# Patient Record
Sex: FEMALE | Race: BLACK OR AFRICAN AMERICAN | Employment: OTHER | URBAN - METROPOLITAN AREA
[De-identification: names, ages, dates, MRNs, and addresses within clinical notes are randomized per-mention and may not be internally consistent; named-entity substitution may affect disease eponyms.]

---

## 2019-11-11 ENCOUNTER — OFFICE VISIT (OUTPATIENT)
Dept: SURGERY | Age: 64
End: 2019-11-11

## 2019-11-11 VITALS
HEIGHT: 63 IN | OXYGEN SATURATION: 94 % | BODY MASS INDEX: 39.34 KG/M2 | HEART RATE: 84 BPM | SYSTOLIC BLOOD PRESSURE: 126 MMHG | DIASTOLIC BLOOD PRESSURE: 82 MMHG | TEMPERATURE: 97.7 F | RESPIRATION RATE: 17 BRPM | WEIGHT: 222 LBS

## 2019-11-11 DIAGNOSIS — K21.9 GASTROESOPHAGEAL REFLUX DISEASE, ESOPHAGITIS PRESENCE NOT SPECIFIED: ICD-10-CM

## 2019-11-11 DIAGNOSIS — E11.9 TYPE 2 DIABETES MELLITUS WITHOUT COMPLICATION, UNSPECIFIED WHETHER LONG TERM INSULIN USE (HCC): ICD-10-CM

## 2019-11-11 DIAGNOSIS — E66.01 SEVERE OBESITY (HCC): Primary | ICD-10-CM

## 2019-11-11 DIAGNOSIS — K75.81 NASH (NONALCOHOLIC STEATOHEPATITIS): ICD-10-CM

## 2019-11-11 DIAGNOSIS — I10 ESSENTIAL HYPERTENSION: ICD-10-CM

## 2019-11-11 RX ORDER — FLUTICASONE PROPIONATE 44 UG/1
AEROSOL, METERED RESPIRATORY (INHALATION)
COMMUNITY
End: 2022-01-19 | Stop reason: ALTCHOICE

## 2019-11-11 RX ORDER — OMEPRAZOLE 20 MG/1
TABLET, DELAYED RELEASE ORAL
COMMUNITY
Start: 2018-01-22

## 2019-11-11 RX ORDER — MELATONIN
2000 DAILY
COMMUNITY

## 2019-11-11 RX ORDER — HYDROCHLOROTHIAZIDE 25 MG/1
TABLET ORAL
COMMUNITY
Start: 2016-02-24

## 2019-11-11 RX ORDER — GLIMEPIRIDE 2 MG/1
TABLET ORAL
COMMUNITY
Start: 2016-02-24 | End: 2019-11-11 | Stop reason: CLARIF

## 2019-11-11 RX ORDER — DICLOFENAC SODIUM 75 MG/1
TABLET, DELAYED RELEASE ORAL
COMMUNITY
Start: 2017-04-10

## 2019-11-11 RX ORDER — INSULIN DEGLUDEC 100 U/ML
INJECTION, SOLUTION SUBCUTANEOUS
COMMUNITY
Start: 2017-06-21 | End: 2022-09-16 | Stop reason: SDUPTHER

## 2019-11-11 RX ORDER — CETIRIZINE HCL 10 MG
TABLET ORAL
COMMUNITY

## 2019-11-11 RX ORDER — AMLODIPINE BESYLATE 10 MG/1
TABLET ORAL
COMMUNITY

## 2019-11-11 RX ORDER — GLIPIZIDE 5 MG/1
TABLET ORAL
COMMUNITY
End: 2022-01-19 | Stop reason: ALTCHOICE

## 2019-11-11 RX ORDER — ALBUTEROL SULFATE 90 UG/1
AEROSOL, METERED RESPIRATORY (INHALATION)
COMMUNITY

## 2019-11-11 RX ORDER — RAMIPRIL 10 MG/1
CAPSULE ORAL
COMMUNITY
Start: 2016-02-24 | End: 2022-01-19 | Stop reason: ALTCHOICE

## 2019-11-11 RX ORDER — EZETIMIBE 10 MG/1
1 TABLET ORAL DAILY
COMMUNITY

## 2019-11-11 NOTE — PROGRESS NOTES
1. Have you been to the ER, urgent care clinic since your last visit? Hospitalized since your last visit? No     2. Have you seen or consulted any other health care providers outside of the [address] [place] [address] since your last visit? Include any pap smears or colon screening. No    Mauricio Villareal  Body composition    female  75 y.o. Vitals:    11/11/19 1331   Weight: 222 lb (100.7 kg)   Height: 5' 3\" (1.6 m)     Body mass index is 39.33 kg/m². Clovia Carls Neck-  14in  Waist- 44.5in  Hips- 46in

## 2019-11-11 NOTE — PROGRESS NOTES
HISTORY OF PRESENT ILLNESS  Geovanna Cuevas is new patient presents today for evaluation for weight loss surgery. Patient has been overweight for the past 25-30 years. Her weight has ranged from 190-234 lbs. Her heaviest weight is 234 lbs. Sandra Pastors  Body composition    female  75 y.o. Vitals:    11/11/19 1331   Weight: 222 lb (100.7 kg)   Height: 5' 3\" (1.6 m)     Body mass index is 39.33 kg/m². Raine Shadow Neck-  14in  Waist- 44.5in  Hips- 46in     Dietary Habits: Breakfast- oatmeal, egg with [place] and toast  Lunch- sandwich or soup  Dinner- fried foods, chicken and salads, chinese food. Snacking- chocolate, popcorn, grapes and apples, cookies  Liquids- diet ginger ale, flavored water    Prior weight loss attempts: unsupervised diets. She states that cutting back on eating  She lost 15 lbs. Patient has an advanced directive:  Not on file. Ms. Saleem Ford has a reminder for a \"due or due soon\" health maintenance. I have asked that she contact her primary care provider for follow-up on this health maintenance.       Patient Active Problem List   Diagnosis Code    Severe obesity ([address] E66.01       Past Medical History:   Diagnosis Date    Acid indigestion     Asthma     Back pain     Constipation     Depression     Diabetes ([address]     Hypertension     Insomnia     Multiple stiff joints     Muscle ache     Nausea     Shortness of breath     Vomiting     Weakness     Wheezing          Past Surgical History:   Procedure Laterality Date    HX CHOLECYSTECTOMY      HX GI      benign tumor on stomach muscle     HX GYN      Hysterectomy     HX ORTHOPAEDIC      right knee replacement     HX ORTHOPAEDIC      bilateral carpal tunnel repair            Joseph Dumont MD      Allergies not on file      Family History   Problem Relation Age of Onset    Diabetes Mother     Stroke Mother     Cancer Father         Prostate    Hypertension Father     Stroke Father     Diabetes Sister     Heart Disease Sister     Stroke Sister     Hypertension Brother        Social History     Socioeconomic History    Marital status: UNKNOWN     Spouse name: Not on file    Number of children: Not on file    Years of education: Not on file    Highest education level: Not on file   Occupational History    Not on file   Social Needs    Financial resource strain: Not on file    Food insecurity:     Worry: Not on file     Inability: Not on file    Transportation needs:     Medical: Not on file     Non-medical: Not on file   Tobacco Use    Smoking status: Never Smoker    Smokeless tobacco: Never Used   Substance and Sexual Activity    Alcohol use: Not Currently    Drug use: Never    Sexual activity: Yes   Lifestyle    Physical activity:     Days per week: Not on file     Minutes per session: Not on file    Stress: Not on file   Relationships    Social connections:     Talks on phone: Not on file     Gets together: Not on file     Attends Nondenominational service: Not on file     Active member of club or organization: Not on file     Attends meetings of clubs or organizations: Not on file     Relationship status: Not on file    Intimate partner violence:     Fear of current or ex partner: Not on file     Emotionally abused: Not on file     Physically abused: Not on file     Forced sexual activity: Not on file   Other Topics Concern    Not on file   Social History Narrative    Not on file     HPI    Review of Systems   Constitutional: Negative for chills, fever and malaise/fatigue. HENT: Negative for congestion, hearing loss, sinus pain and sore throat. Eyes: Positive for blurred vision. Negative for double vision, pain, discharge and redness. Respiratory: Positive for shortness of breath. Negative for cough. Cardiovascular: Positive for leg swelling (ankles swelling). Negative for chest pain and palpitations. Gastrointestinal: Positive for heartburn.  Negative for abdominal pain (on exertion), constipation, diarrhea, nausea and vomiting. Hx of heartburn. She states that food sometimes gets stuck in her esophagus and she may need her esophagus stretched. She has been referred back to her GI DrBo Haywood. Genitourinary: Positive for frequency. Negative for dysuria and urgency. Musculoskeletal: Positive for back pain and joint pain. Negative for neck pain. Skin: Negative for itching and rash. Neurological: Negative for dizziness, seizures and headaches. Psychiatric/Behavioral: Positive for depression (about her weight). The patient has insomnia. Physical Exam   Constitutional: She is oriented to person, place, and time. She appears well-developed and well-nourished. Cardiovascular: Normal rate and regular rhythm. Exam reveals no gallop and no friction rub. No murmur heard. Pulmonary/Chest: Effort normal and breath sounds normal.   Abdominal: Soft. Bowel sounds are normal. She exhibits no distension. There is no tenderness. No masses or hernias noted   Neurological: She is alert and oriented to person, place, and time. Skin: Skin is warm and dry. Psychiatric: She has a normal mood and affect. ASSESSMENT and PLAN  Sleeve gastrectomy or vertical gastric resection involves a resection of the vast majority of the stomach usually done with a dilator pressed against the right half of the stomach of the lesser curvature. One preserves the pyloric sphincter at the lower end of the stomach and then sequentially staples and divides all the way up to the gastroesophageal junction leaving a small rim of the stomach to the left better known as the angle of His. This procedure significantly reduces the amount that the stomach can hold while removing the part of the stomach that secretes the hormone grehlin and alters the speed of food emptying from the stomach.  Once food leaves the stomach, the remainder of the intestinal tract is completely intact and there is no effect on the digestion or absorption of food nutrients and calories. The procedure is intermediate between the adjustable gastric band and the gastric bypass as far as weight loss, potential complications and resolution of comorbid diseases such as Type 2 diabetes, high blood pressure, sleep apnea, arthritic pain, cholesterol disorders to mention a few. The usual complications are that of any procedure which affects the ability of the stomach to accept food at any given time. Those are usually nausea and vomiting which either spontaneously resolve or require endoscopic dilatation. The most serious complication from this surgery is a leak from the staple line usually near the  gastroesophageal junction. The frequency of this serious complication is low and usually heals spontaneously although reoperation may be necessary. The other serious complications are those which can occur with any major surgery and include  Infection, bleeding, blood clots and/or cardiopulmonary problems. Patient meets criteria established by the NIH. Without weight reduction, co-morbidities will escalate as well as risk of early mortality. Recommendation is patient could be served with surgical weight reduction, the procedure of  Sleeve gastrectomy. I explained to the patient differences between laparoscopic and open gastric bypass, laparoscopic adjustable gastric banding, and sleeve gastrectomy procedures. Patient has attended one our informational meeting and has seen our educational materials. Patient desires to have surgery with Dr. Yenny Escobedo. I have reinforced without lifestyle change and behavior modification, they would not achieve his weight loss goals. I reviewed risks and complications associated with each procedure. She will need an UGI and to be seen by Dr. Yeison Guzmán, possible repeat EGD. After these are completed, she will need to follow up with Dr. Yenny Escobedo.   Other recommendations include psychological evaluation, nutritional consultation. I discussed with patient a diet high in protein, low-fat, low- sugar, limited carbohydrates, and discontinuing use of carbonated beverages. Also discussed physical activity and exercises. I have answered all questions, they wish to proceed. 30 minutes spent face to face with patient, >50% of time spent counseling.    ** Copy to PCP and other providers

## 2019-11-21 ENCOUNTER — HOSPITAL ENCOUNTER (OUTPATIENT)
Dept: GENERAL RADIOLOGY | Age: 64
Discharge: HOME | End: 2019-11-21
Attending: NURSE PRACTITIONER
Payer: OTHER GOVERNMENT

## 2019-11-21 DIAGNOSIS — K21.9 GASTROESOPHAGEAL REFLUX DISEASE, ESOPHAGITIS PRESENCE NOT SPECIFIED: ICD-10-CM

## 2019-11-21 DIAGNOSIS — E66.01 SEVERE OBESITY (HCC): ICD-10-CM

## 2019-11-21 PROCEDURE — 74247 XR UPPER GI W KUB AIR CONT: CPT

## 2019-11-25 ENCOUNTER — CLINICAL SUPPORT (OUTPATIENT)
Dept: SURGERY | Age: 64
End: 2019-11-25

## 2019-11-25 VITALS — BODY MASS INDEX: 39.15 KG/M2 | WEIGHT: 221 LBS

## 2019-11-25 DIAGNOSIS — E66.9 OBESITY (BMI 30-39.9): Primary | ICD-10-CM

## 2019-11-25 NOTE — PROGRESS NOTES
Pre-operative Bariatric Nutrition Evaluation (1of 6)     Date: 2019   Jose Choudhary M.D. Name: Krishan Monroy  :  1955  Age:  75  Gender: Female   Type of Surgery: []           Gastric Bypass  []           LAGB  [x]           Sleeve Gastrectomy    ASSESSMENT:    Past Medical History:HTN, Type II DM, depression, high cholesterol, arthritis, asthma, osteopenia, diverticulosis, diabetic retinopathy, carpal tunnel, recently diagnosed with hiatal hernia      Medications/Supplements:   Prior to Admission medications    Medication Sig Start Date End Date Taking? Authorizing Provider   aflibercept (EYLEA) 2 mg/0.05 mL soln Eylea 2 mg/0.05 mL intravitreal solution for injection   Inject by intraocular route. Provider, Historical   omeprazole (PRILOSEC OTC) 20 mg tablet omeprazole 20 mg tablet,delayed release   Prescribed by genaro Carpenter6/70Zonia Vincent MD 18   Provider, Historical   albuterol (PROAIR HFA) 90 mcg/actuation inhaler ProAir HFA    Provider, Historical   amLODIPine (NORVASC) 10 mg tablet amlodipine 10 mg tablet   Take 1 tablet every day by oral route. Provider, Historical   aspirin-calcium carbonate 81 mg-300 mg calcium(777 mg) tab Adult Low Dose Aspirin 81 mg tablet 16   Provider, Historical   dulaglutide (TRULICITY) 1.5 UD/9.1 mL sub-q pen Trulicity 1.5 AL/7.9 mL subcutaneous pen injector   Prescribed by genaro Carpenter6/70Zonia Vincent MD 17   Provider, Historical   diclofenac EC (VOLTAREN) 75 mg EC tablet diclofenac sodium 75 mg tablet,delayed release   Take 1 tablet twice a day by oral route. 4/10/17   Provider, Historical   glipiZIDE (GLUCOTROL) 5 mg tablet glipizide 5 mg tablet   Take 1 tablet twice a day by oral route.     Provider, Historical   hydroCHLOROthiazide (HYDRODIURIL) 25 mg tablet hydrochlorothiazide 25 mg tablet 16   Provider, Historical   insulin degludec (TRESIBA FLEXTOUCH U-100) 100 unit/mL (3 mL) inpn Tresiba FlexTouch U-100 insulin 100 unit/mL (3 mL) subcutaneous pen Prescribed by non 606/706 Donita Vincent MD 6/21/17   Provider, Historical   linaGLIPtin (TRADJENTA) 5 mg tablet Tradjenta 5 mg tablet   Take 1 tablet every day by oral route. Provider, Historical   ramipril (ALTACE) 10 mg capsule ramipril 10 mg capsule 2/24/16   Provider, Historical   fluticasone propionate (FLOVENT HFA) 44 mcg/actuation inhaler Take  by inhalation. Provider, Historical   cetirizine (ZYRTEC) 10 mg tablet Take  by mouth. Provider, Historical   cholecalciferol (VITAMIN D3) (1000 Units /25 mcg) tablet Take 5,000 Units by mouth daily. Provider, Historical   Bifidobacterium Infantis (ALIGN) 4 mg cap Take  by mouth. Provider, Historical   ezetimibe (ZETIA) 10 mg tablet Take 1 Tab by mouth daily. Provider, Historical       Food Allergies/Intolerances:none    Anthropometrics:    Ht:63\"   Wt: 221#    IBW: 115#    %IBW:  192%    BMI:39    Category: obesity III     Reported wt history: Pt presents today for pre-op nutrition evaluation for wt loss surgery. Reports lowest adult BW of 115# at age 25 and highest adult BW of 234# in recent years. Attributes wt gain over the years r/t pregnancy, emotional eating and physical inactivity. Has attempted wt loss in the past by \"cutting out fried foods and joined [employer] and was able to lose about 14#. Has been unable to maintain long term or significant wt loss and is now seeking approval for wt loss surgery. Pt will need to complete 6 months of supervised weight loss for insurance requirements.      Exercise/Physical Activity:walking for 1 hour on treadmill, every few months; states her son, who lives in [place], is paying for her gym membership     Reported Diet History:self-directed diets (reduced fried foods), exercise     24 Hour Diet Recall  Breakfast  Oatmeal, 2 boiled eggs, 2 pcs [place], 1 whole wheat toast   Lunch  [place] ([facility]) sandwich, soup, salad    Dinner  Baked chicken or fish, rice, greens or salad    Snacks  Cupcakes, candy bar, ice cream, pie/cake pretzels, popcorn    Beverages  Ginger tea, diet ginger ale, water, coffee       A pre-op nutrition checklist was reviewed. Patient checked off 4 of 15 items. Environment/Psychosocial/Support:pt lists her  as main support system and rates level of support a 7 out of 10. Pt is retired and shares grocery shopping and cooking with her . Pt's adult children live out of town and are supportive of healthy lifestyle habits. NUTRITION DIAGNOSIS:  1. Excessive energy intake r/t food and snack choices evidenced by diet recall that reveals mostly high caloric density snack choices. 2. Physical inactivity r/t frequency of exercise evidenced by pt reports infrequent exercise. Goes to the gym once every few months. 3. Food and nutrition related knowledge deficit r/t lack of prior exposure to information evidenced by pt seeking nutrition education for bariatric surgery. NUTRITION INTERVENTION:  Pt educated on nutrition recommendations for weight loss surgery, specifically sleeve gastrectomy  . Instructed on consuming 3 meals per day starting now. Use the balanced plate method to plan meals, include 3 oz of lean source of protein, 1/2 cup whole grains, unlimited non-starchy vegetables, 1/2 cup fruit and 1 serving of low fat dairy. Utilize handouts listing healthy snack and meal ideas to limit restaurant meals. After surgery measure all meals to 1/2 cup. Each meal will contain a 1/4 cup lean protein and 1/4 cup fruit, non-starchy vegetable or starch (limiting to once per day). Aim for 60 g protein per day. Sip on 48-64 oz of sugar free, calorie free, non-carbonated beverages each day. Do not use a straw. Do not consume beverages 30 minutes before, during or 30 minutes after meals. Read all nutrition labels. Demonstrated and emphasized identifying serving size, total fat, sugar and protein content. Defined low fat as </= 3 g per serving. Discussed lean and extra lean sources of protein. Provided list of low fat cooking methods. Avoid foods with sugar listed in the first 3 ingredients and >/15 g sugar per serving. Excess sugar/fat intake may lead to dumping syndrome. Discussed signs and symptoms of dumping syndrome. Practice mindful eating habits; take small bites, chew thoroughly, avoid distractions, utilize hunger/fullness scale. Consume meals over 20-30 minutes. Attend Bariatric Support Group and increase physical activity (approved per MD) for long term weight maintenance. NUTRITION MONITORING AND EVALUATION:    The following goals were established with patient;  1. Improve snack choices to include more nutrient dense choices. Healthy snack handouts provided. We discussed the concept of using a snack as a way to increase protein or fruit/vegetable intake. Allow a snack if physically hungry. Avoid snacking if bored, stress, tired, sad, etc.   2. Implement more consistent and routine exercise regimen. [employer] Aim for 150 minutes per week to promote wt loss. 3. Review nutrition guidelines for bariatric surgery provided today. Follow up with RD next month for continued nutrition education and supervised weight loss. Specific tips and techniques to facilitate compliance with above recommendations were provided and discussed. Nutrition evaluation reveals important lifestyle changes are indicated. Goals set and recommendations made. Will continue to assess. If further details are desired please feel free to contact me at [phone]. This phone number was also provided to the patient for any further questions or concerns.            Nabil Andrade RD

## 2019-11-29 NOTE — PROGRESS NOTES
Spoke with patient. She has an EGD 12/16/2019 with Her GI. We will get a copy of her EGD and she may need to follow up after completed.

## 2020-01-02 ENCOUNTER — CLINICAL SUPPORT (OUTPATIENT)
Dept: SURGERY | Age: 65
End: 2020-01-02

## 2020-01-02 VITALS — BODY MASS INDEX: 38.79 KG/M2 | WEIGHT: 219 LBS

## 2020-01-02 DIAGNOSIS — E66.9 OBESITY (BMI 30-39.9): Primary | ICD-10-CM

## 2020-01-02 NOTE — PROGRESS NOTES
[employer] [facility]  Supervised Weight Loss     Date:   2020    Patient's Name: Lavelle Gould  : 1955    Insurance:  [employer]          Session: 2 of  6  Surgery: Sleeve Gastrectomy  Surgeon:  Matteo Curiel M.D. Height: 63\"  Weight:    219      Lbs. BMI: 38   Pounds Lost since last month: 2#               Pounds Gained since last month: 0    Starting Weight: 221#   Previous Months Weight: 221#  Overall Pounds Lost: 2#  Overall Pounds Gained: 0    Other Pertinent Information: n/a     Smoking Status:  none  Alcohol Intake: none    I have reviewed with pt the guidelines of the supervised wt loss program.  Pt understands the expectations of some wt loss during the program and that wt gain could delay the process. I have also explained that classes need to be consecutive. Missing a class may result in starting over. Pt has received this information in writing. Changes that patient has made since last month include:  Eliminated fried foods, stopped eating chips and cupcakes. Eating Habits and Behaviors  A nutrition lesson specific to vitamins was provided. We discussed the various reasons for needing vitamins and different types and doses. General healthy eating guidelines were also discussed. Pts were instructed that their plate should be made up 1/2 plate coming from non-starchy vegetables, 1/4 coming from lean meat, and 1/4 of their plate coming from carbohydrates, including fruits, starches, or milk. We discussed measuring meals to 1/2 cup total per meal after surgery. Drinking only calorie-free, sugar-free and non-carbonated beverages. We discussed the importance of drinking 64 ounces of fluid per day to prevent dehydration post-operatively. Patient's current diet habits include: eating 2-3 meals per day. Snacking on peanut butter and apples, some sweets. Eating crackers, bread, rice and cereal 2-3 times per week. Eating cookies 2-3 times per week. Eating mixture of baked, grilled, broiled and fried foods. Eating out is 1-3 times per week. Drinking 8 oz water, 8 oz unsweetened tea, 16 oz diet soda. Reports yes to emotional eating and \"will start back exercise at the gym\" as a coping strategy. Reports food choices, portion sizes, snacking and lack of activity are biggest barriers to weight loss. Physical Activity/Exercise  We talked about the importance of increasing daily physical activity and beginning to develop an exercise regimen/routine. We talked about exercise as being an important part of long term weight loss after surgery. Comments:  During class, I discussed with patient the importance of getting into an exercise routine. Pt is currently not exercising stating \"extremely busy for holidays but will start back once a week\" for activity. Pt has been encouraged to resume exercise and work up to 150 minutes per week to promote wt loss. Behavior Modification       We talked about how to eat more mindfully and identify emotional eating triggers. Tips and recommendations for how to make these changes were provided. Pt was encouraged to keep a food journal and record what they were taking in daily. Overall Assessment: Pt demonstrates small/appropriate lifestyle changes in preparation for bariatric surgery evidenced by reported changes and weight loss. Will continue to assess. Patient-Set Goals:   1. Nutrition - reduce snacking, reduce starchy and fried foods  2. Exercise - start back at [employer]  3.  Behavior -more positive outlook to achieve my goals     Lester Heller, [address]  1/2/2020

## 2020-02-05 ENCOUNTER — CLINICAL SUPPORT (OUTPATIENT)
Dept: SURGERY | Age: 65
End: 2020-02-05

## 2020-02-05 DIAGNOSIS — E66.9 OBESITY (BMI 30-39.9): Primary | ICD-10-CM

## 2020-02-06 VITALS — BODY MASS INDEX: 38.79 KG/M2 | WEIGHT: 219 LBS

## 2020-02-10 NOTE — PROGRESS NOTES
[facility]  Supervised Weight Loss     Date:   2/10/2020    Patient's Name: Kate   : 1955    Insurance:  [employer]          Session: 3 of  6  Surgery: Sleeve Gastrectomy  Surgeon:  Maira Curry M.D. Height: 63\" Weight:    219      Lbs. BMI: 38   Pounds Lost since last month: 0#               Pounds Gained since last month: 0#    Starting Weight: 221#   Previous Months Weight: 219#  Overall Pounds Lost: 2# Overall Pounds Gained: 0    Other Pertinent Information: n/a    Smoking Status:  none  Alcohol Intake: none    I have reviewed with pt the guidelines of the supervised wt loss program.  Pt understands the expectations of some wt loss during the program and that wt gain could delay the process. I have also explained that appointments need to be consecutive and missing an appointment may result in starting over. Pt has received this information in writing. Changes that patient has made since last month include:  No new lifestyle changes have been implemented since last month's visit. Eating Habits and Behaviors  General healthy eating guidelines were also discussed. Pts were instructed that their plate should be made up 1/2 plate coming from non-starchy vegetables, 1/4 coming from lean meat, and 1/4 of their plate coming from carbohydrates, including fruits, starches, or milk. We discussed measuring meals to 1/2 cup total per meal after surgery. Drinking only calorie-free, sugar-free and non-carbonated beverages. We discussed the importance of drinking 64 ounces of fluid per day to prevent dehydration post-operatively. Patient's current diet habits include: pt still eating 2-3 meals daily, snacking between breakfast and lunch as well as after dinner. Snack foods includes peanut butter crackers, veggie straws, and chips. Pt still eating crackers, bread, and cereal once daily and cookies twice a week. Reports eating out still 1-3 times per week and drinking 24 ox fluid a day. Pt reports none of this fluid is water recently but is unsweetened tea (8oz) and diet soda (16oz). Pt reports still emotionally eating 3-4 times per week. Physical Activity/Exercise  We talked about the importance of increasing daily physical activity and beginning to develop an exercise regimen/routine. We talked about exercise as being an important part of long term weight loss after surgery. Comments:  During class, I discussed with patient the importance of getting into an exercise routine. Pt is currently doing zero minutes of activity and not exercising due to being busy with \"family matters. \"  Pt has been encouraged to resume an exercise routine. Behavior Modification       A behavior modification lesson was provided with an emphasis on developing mindful eating behaviors. We talked about how to eat more mindfully and identify emotional eating triggers. Tips and recommendations for how to make these changes were provided. Pt was encouraged to keep a food journal and record what they were taking in daily. Overall Assessment: Pt has made little change in some of her overall lifestyle behaviors including no water, and not restarting an exercise routine after the holidays, which was the identified barrier at last visit. This is evidenced by no change in weight this month. Will continue to assess. Patient-Set Goals:   1. Nutrition - eating more vegetables, focusing on smaller portions, and reducing snacks and cookies  2. Exercise - walk on treadmill at gym twice weekly by going back to [employer]  3.  Behavior -managing stress better with increasing gym time    Malini Kennedy, YURY  2/10/2020

## 2020-03-04 ENCOUNTER — CLINICAL SUPPORT (OUTPATIENT)
Dept: SURGERY | Age: 65
End: 2020-03-04

## 2020-03-04 VITALS — WEIGHT: 217 LBS | BODY MASS INDEX: 38.44 KG/M2

## 2020-03-04 DIAGNOSIS — E66.9 OBESITY (BMI 30-39.9): Primary | ICD-10-CM

## 2020-03-24 NOTE — PROGRESS NOTES
[facility] at [facility]  Supervised Weight Loss     Date:   3/4/2020    Patient's Name: Mariposa Palma  : 1955    Insurance:  [employer]          Session: 4 of  6  Surgery: Sleeve Gastrectomy  Surgeon:  Edel Serna M.D. Height: 63\" Weight:    217#      Lbs. BMI: 38   Pounds Lost since last month: 2#               Pounds Gained since last month: 0    Starting Weight: 221#   Previous Months Weight: 219#  Overall Pounds Lost: 4# Overall Pounds Gained: 0    Other Pertinent Information: n/a    Smoking Status:  none  Alcohol Intake: not reported    I have reviewed with pt the guidelines of the supervised wt loss program.  Pt understands the expectations of some wt loss during the program and that wt gain could delay the process. I have also explained that appointments need to be consecutive and missing an appointment may result in starting over. Pt has received this information in writing. Changes that patient has made since last month include:  None reported. Eating Habits and Behaviors  General healthy eating guidelines were discussed. A nutrition lesson was presented on portion control. Patients were instructed implement portion control now using the balanced plate method (1/2 plate non-starchy vegetables, 1/4 plate lean meat, and 1/4 plate whole grains and to include fruit and/or milk at meals or snack). We discussed measuring meals to 1/2 cup total per meal after surgery and appropriate portion progression long term. Patient's current diet habits include: eating 2-3 meals per day, snacking between breakfast and lunch, and after dinner, on cookies, crackers. Eating bread/cereal/cookies at least 3-4 times per week. Eats out 1-3 days per week, and eats mix of baked, grilled, fried, and broiled food. 16 oz water daily, 24 oz diet soda daily, 8 oz coffee daily.   Emotionally eats and lists food choices, lack of activity, and snacking as triggers for managing weight. Physical Activity/Exercise  We talked about the importance of increasing daily physical activity and beginning to develop an exercise regimen/routine. We talked about exercise as being an important part of long term weight loss after surgery. Comments:  During class, I discussed with patient the importance of getting into an exercise routine. Pt is currently doing no activity and notes \"nothing\" keeps her from doing it. Pt has been encouraged to start, even if small increments spread throughout the day. Behavior Modification       We talked about how to eat more mindfully. Tips and recommendations for how to make these changes were provided. Pt was encouraged to keep a food journal and record what they were taking in daily. Overall Assessment: pt making some lifestyle changes evidenced by weight loss. Will continue to assess. Patient-Set Goals:   1. Nutrition - more vegetables, smaller portions, protein shakes PRN  2. Exercise - increase walking, go to gym  3.  Behavior - manage stress, less impulse eating    July Jo, RD  3/4/2020

## 2020-03-31 ENCOUNTER — TELEPHONE (OUTPATIENT)
Dept: SURGERY | Age: 65
End: 2020-03-31

## 2020-03-31 NOTE — TELEPHONE ENCOUNTER
Called patient at both numbers and left voicemails regarding her in-person class for nutrition being moved to a telephone call. An email was sent to email address on file as well.

## 2020-04-22 ENCOUNTER — OFFICE VISIT (OUTPATIENT)
Dept: SURGERY | Age: 65
End: 2020-04-22

## 2020-04-22 DIAGNOSIS — E66.9 OBESITY (BMI 30-39.9): Primary | ICD-10-CM

## 2020-04-30 VITALS — BODY MASS INDEX: 38.44 KG/M2 | WEIGHT: 217 LBS

## 2020-05-01 NOTE — PROGRESS NOTES
[facility] at [facility]  Supervised Weight Loss     Date:   2020    Patient's Name: Darrell Coyne  : 1955    Insurance:  [employer]          Session:   Surgery: Sleeve Gastrectomy  Surgeon:  Ute Kennedy M.D. Height: 63\" Weight:    217#      Lbs. BMI: 38   Pounds Lost since last month: 0               Pounds Gained since last month: 0    Starting Weight: 221#   Previous Months Weight: 217#  Overall Pounds Lost: 4# Overall Pounds Gained: 0    Other Pertinent Information: Today's visit was by telephone in accordance to the guidelines and recommendations of the Centers for Disease Control and Prevention and the [facility] Department of Health policies on OMOFU-71. Azar Harris weight was pulled from last months visit, 3/4/2020. Smoking Status:  none  Alcohol Intake: did not report    I have reviewed with pt the guidelines of the supervised wt loss program.  Pt understands the expectations of some wt loss during the program and that wt gain could delay the process. I have also explained that appointments need to be consecutive and missing an appointment may result in starting over. Pt has received this information in writing. Changes that patient has made since last month include: Increased snacking due to increased stress from COVID-19. Eating Habits and Behaviors  General healthy eating guidelines were also discussed. Pts were instructed that their plate should be made up 1/2 plate coming from non-starchy vegetables, 1/4 coming from lean meat, and 1/4 of their plate coming from carbohydrates, including fruits, starches, or milk. We discussed measuring meals to 1/2 cup total per meal after surgery. Drinking only calorie-free, sugar-free and non-carbonated beverages. We discussed the importance of drinking 64 ounces of fluid per day to prevent dehydration post-operatively.                        Patient's current diet habits include: eats 2-3 meals every day, snacking on crackers, cookies.  and self do shopping and cooking. Eating out 2-3 days per week. water 8 oz day, unsweet tea 16 oz day, diet soda 8 oz day. Emotionally eats at times, lists food choices/grazing/inactivity as barriers to weight loss goals. Physical Activity/Exercise  An exercise presentation was provided including information about exercise programs available both before and after surgery. We talked about the importance of increasing daily physical activity and beginning to develop an exercise regimen/routine. We talked about exercise as being an important part of long term weight loss after surgery. Comments:  During class, I discussed with patient the importance of getting into an exercise routine. Pt is currently no activity. Has a gym membership but it is closed at this time. Pt has been encouraged to start and increase slowly to 150 minutes per week. Behavior Modification       We talked about how to eat more mindfully. Tips and recommendations for how to make these changes were provided. Pt was encouraged to keep a food journal and record what they were taking in daily. Overall Assessment: Pt made some changes but has slipped back into grazing and stress eating due to increased stress of current pandemic. Encouraged patient to focus on goals and use energy in other ways, find a new hobby, increase walking, etc.  Pt willing to try. Will assess progress at next visit. Patient-Set Goals:   1. Nutrition - better food choices, less snacks  2. Exercise -  Walking/moving more in the home  3. Behavior - listen to music, read to reduce stress.     Brain Tamayo, RD  4/22/2020 DISPLAY PLAN FREE TEXT

## 2020-05-06 ENCOUNTER — CLINICAL SUPPORT (OUTPATIENT)
Dept: SURGERY | Age: 65
End: 2020-05-06

## 2020-05-06 DIAGNOSIS — E66.9 OBESITY (BMI 30-39.9): Primary | ICD-10-CM

## 2020-05-08 NOTE — PROGRESS NOTES
[employer] [facility] at [facility]  Supervised Weight Loss     Date:   2020                       Patient's Name: Carlito East  : 1955     Insurance:  [id number] [facility]          Session:  Surgery: Sleeve Gastrectomy                     Surgeon:  Demetrius Garcia M.D.      Height: 63\"      Reported Weight:    217#      Lbs. BMI: 38             Pounds Lost since last month: 0               Pounds Gained since last month: 0     Starting Weight: 221#                       Previous Months Weight: 217#  Overall Pounds Lost: 4#       Overall Pounds Gained: 0     Other Pertinent Information: Today's visit was by telephone in accordance to the guidelines and recommendations of the Centers for Disease Control and Prevention and the [facility] Department of Health policies on . Mykel [place] weight was a reported weight.        I have reviewed with pt the guidelines of the supervised wt loss program.  Pt understands the expectations of some wt loss during the program and that wt gain could delay the process. I have also explained that appointments need to be consecutive and missing an appointment may result in starting over. Pt has received this information in writing. Changes that patient has made since last month include:  Pt reports no new changes made, has maintained previously made changes. Eating Habits and Behaviors  A nutrition lesson was presented on label reading with specific guidelines provided for limiting added sugars. This information will help increase healthy food choices, promote weight loss and prevent dumping syndrome after gastric bypass. We also reviewed the general nutrition guidelines for bariatric surgery. Patient's current diet habits include: eats 2-3 meals every day, snacking on crackers, cookies.  and self do shopping and cooking. Eating out 2-3 days per week.  water 8 oz day, unsweet tea 16 oz day, diet soda 8 oz day. Emotionally eats at times, lists food choices/grazing/inactivity as barriers to weight loss goals        Physical Activity/Exercise  We talked about the importance of increasing daily physical activity and beginning to develop an exercise regimen/routine. We talked about exercise as being an important part of long term weight loss after surgery. Comments:  During class, I discussed with patient the importance of getting into an exercise routine. Pt is currently not engaged in physical activity stating her gym is currently closed. Pt has been encouraged to resume exercise once able and consider walking outdoors for 30 minutes daily (or 10 minutes, 3 times per day). Behavior Modification       We talked about how to eat more mindfully. Tips and recommendations for how to make these changes were provided. Pt was encouraged to keep a food journal and record what they were taking in daily. Overall Assessment: Pt has completed supervised weight loss requirements. Based on previous sessions appears to be an appropriate candidate. Would benefit from continued behavior modification with regard to reported emotional eating behaviors. We did discuss tools to help support self-monitoring during stressful times including weekly weight checks at home and keeping a food journal. Otherwise appears to be an appropriate candidate at this time. Patient-Set Goals:   1. Nutrition - continue to work on eating 3 meals a day and not skipping meals   2. Exercise - implement walking for exercise until gym re-opens  3.  Behavior -stress management     Alecia Hood, YRUY  5/8/2020

## 2020-06-12 ENCOUNTER — TELEPHONE (OUTPATIENT)
Dept: SURGERY | Age: 65
End: 2020-06-12

## 2020-06-15 ENCOUNTER — OFFICE VISIT (OUTPATIENT)
Dept: SURGERY | Age: 65
End: 2020-06-15

## 2020-06-15 VITALS
TEMPERATURE: 99.3 F | OXYGEN SATURATION: 92 % | HEART RATE: 76 BPM | RESPIRATION RATE: 16 BRPM | WEIGHT: 219 LBS | HEIGHT: 63 IN | SYSTOLIC BLOOD PRESSURE: 146 MMHG | DIASTOLIC BLOOD PRESSURE: 84 MMHG | BODY MASS INDEX: 38.8 KG/M2

## 2020-06-15 DIAGNOSIS — K74.00 LIVER FIBROSIS: ICD-10-CM

## 2020-06-15 DIAGNOSIS — E66.01 SEVERE OBESITY (BMI 35.0-35.9 WITH COMORBIDITY) (HCC): Primary | ICD-10-CM

## 2020-06-15 NOTE — PROGRESS NOTES
1. Have you been to the ER, urgent care clinic since your last visit? Hospitalized since your last visit? No    2. Have you seen or consulted any other health care providers outside of the [address] [place] [address] since your last visit? Include any pap smears or colon screening.  No

## 2020-06-16 NOTE — PROGRESS NOTES
Bariatric Surgery Consult    Leobardo Andrews is a 77 y.o. female with a history of morbid obesity. Her Height: 5' 3\" (160 cm), Weight: 219 lb (99.3 kg). Body mass index is 38.79 kg/m². She reports that she has been trying to lose weight for 10 years. Her maximum weight was 230 pounds. She has attended our bariatric surgery information seminar. Quita Vasquez wants to consider laparoscopic gastric bypass surgery. She seems a little unsure about having surgery at this point currently. She seems somewhat interested in trying some medical weight loss for the time being. Pt is referred by:  Gui Sanchez MD.        Comorbidities:     Bariatric comorbidities present: hypertension, insulin dependent diabetes, GERD, chronic back problems, osteoarthritis and obstructive sleep apnea    Ambulatory status: independent    The patient's reported level of exercise: not active.       Patient Active Problem List    Diagnosis Date Noted    Severe obesity ([facility] [address] 11/11/2019     Past Medical History:   Diagnosis Date    Acid indigestion     Asthma     Back pain     Constipation     Depression     Diabetes (HCC)     Hypertension     Insomnia     Multiple stiff joints     Muscle ache     ADAMSON (nonalcoholic steatohepatitis)     Nausea     Shortness of breath     Vomiting     Weakness     Wheezing       Past Surgical History:   Procedure Laterality Date    HX CHOLECYSTECTOMY      HX GI      benign tumor on stomach muscle     HX GYN      Hysterectomy     HX ORTHOPAEDIC      right knee replacement     HX ORTHOPAEDIC      bilateral carpal tunnel repair       Social History     Tobacco Use    Smoking status: Never Smoker    Smokeless tobacco: Never Used   Substance Use Topics    Alcohol use: Not Currently      Family History   Problem Relation Age of Onset    Diabetes Mother     Stroke Mother     Cancer Father         Prostate    Hypertension Father     Stroke Father     Diabetes Sister     Heart Disease Sister  Stroke Sister     Hypertension Brother       . Current Outpatient Medications   Medication Sig    aflibercept (EYLEA) 2 mg/0.05 mL soln Eylea 2 mg/0.05 mL intravitreal solution for injection   Inject by intraocular route.  omeprazole (PRILOSEC OTC) 20 mg tablet omeprazole 20 mg tablet,delayed release   Prescribed by non RUTH GIBSON    albuterol (PROAIR HFA) 90 mcg/actuation inhaler ProAir HFA    amLODIPine (NORVASC) 10 mg tablet amlodipine 10 mg tablet   Take 1 tablet every day by oral route.  aspirin-calcium carbonate 81 mg-300 mg calcium(777 mg) tab Adult Low Dose Aspirin 81 mg tablet    dulaglutide (TRULICITY) 1.5 UB/8.3 mL sub-q pen Trulicity 1.5 EA/9.7 mL subcutaneous pen injector   Prescribed by non [facility] MD    diclofenac EC (VOLTAREN) 75 mg EC tablet diclofenac sodium 75 mg tablet,delayed release   Take 1 tablet twice a day by oral route.  glipiZIDE (GLUCOTROL) 5 mg tablet glipizide 5 mg tablet   Take 1 tablet twice a day by oral route.  hydroCHLOROthiazide (HYDRODIURIL) 25 mg tablet hydrochlorothiazide 25 mg tablet    insulin degludec (TRESIBA FLEXTOUCH U-100) 100 unit/mL (3 mL) inpn Tresiba FlexTouch U-100 insulin 100 unit/mL (3 mL) subcutaneous pen   Prescribed by genaro RUTH GIBSON    linaGLIPtin (TRADJENTA) 5 mg tablet Tradjenta 5 mg tablet   Take 1 tablet every day by oral route.  ramipril (ALTACE) 10 mg capsule ramipril 10 mg capsule    fluticasone propionate (FLOVENT HFA) 44 mcg/actuation inhaler Take  by inhalation.  cetirizine (ZYRTEC) 10 mg tablet Take  by mouth.  cholecalciferol (VITAMIN D3) (1000 Units /25 mcg) tablet Take 5,000 Units by mouth daily.  Bifidobacterium Infantis (ALIGN) 4 mg cap Take  by mouth.  ezetimibe (ZETIA) 10 mg tablet Take 1 Tab by mouth daily. No current facility-administered medications for this visit.        Allergies   Allergen Reactions    Sulfa (Sulfonamide Antibiotics) Nausea and Vomiting         Review of Systems:    Constitutional: negative  Ears, Nose, Mouth, Throat, and Face: negative  Respiratory: negative  Cardiovascular: negative  Gastrointestinal: positive for reflux symptoms  Genitourinary:negative  Integument/Breast: negative  Hematologic/Lymphatic: negative  Musculoskeletal:positive for stiff joints and back pain  Neurological: negative  Behavioral/Psychiatric: negative  Endocrine: negative  Allergic/Immunologic: negative    Objective:     Visit Vitals  /84 (BP 1 Location: Left arm, BP Patient Position: Sitting)   Pulse 76   Temp 99.3 °F (37.4 °C) (Oral)   Resp 16   Ht 5' 3\" (1.6 m)   Wt 219 lb (99.3 kg)   SpO2 92%   BMI 38.79 kg/m²        Physical Exam:    General:  alert, no distress, morbidly obese   Eyes:  conjunctivae and sclerae normal, pupils equal, round, reactive to light, extraocular movements intact without nystagmus   Throat & Neck: no erythema or exudates noted and neck supple and symmetrical; no palpable masses   Lungs:   clear to auscultation bilaterally   Heart:  Regular rate and rhythm   Abdomen:   obese, soft, nontender, nondistended, no masses or organomegaly,    Extremities: no edema,  no gait disturbances   Skin: Normal.     UGI - FINDINGS: Examination of the hypopharynx and cervical esophagus is normal.  Examination of the esophagus reveals a small to moderate-sized hiatal hernia,  with Schatzki ring and significant spontaneous gastroesophageal reflux to the  upper thoracic esophagus, but without stricture or obvious esophagitis. . .     Examination of the stomach and duodenum reveals prompt gastric emptying and  no  active ulcer formation or other mucosal inflammatory change or mass .     Proximal small bowel loops are normal in position and anatomy.         IMPRESSION  IMPRESSION:  1. Small to moderate hiatal hernia, with significant spontaneous  gastroesophageal reflux but no obvious esophagitis or stricture. .  2. Otherwise unremarkable air contrast upper GI. Argenis Miller Assessment:     1.  Morbid obesity (Body mass index is 38.79 kg/m².) with multiple comorbidities. The patient meets criteria established by the NIH for weight loss surgery candidates. Without weight reduction, co-morbidities will escalate as well as increase risk of early mortality. Our recommendation is the patient could be served with laparoscopic gastric bypass surgery. I explained to the patient differences between laparoscopic gastric bypass, laparoscopic adjustable gastric banding, and laparoscopic vertical sleeve gastrectomy with respect to expected weight loss, resolution of comorbidities and risks. Ms. Eagle Boyle has attended one our informational meetings and has seen our educational materials. She has requested Dr. Zuleyka Burgos to perform her procedure. I reviewed the role for this procedure as a tool to help her achieve her weight loss goals. I reminded her that effective weight loss comes from lifelong adherence to changes in dietary choices, eating habits and exercise. Recommendation:     She has been through the bariatric surgery process and is a good candidate for bariatric surgery. She is quite unsure about having surgery at this point though and is not sure she wants to move forward with surgery. She says she still needs to do her psychological evaluation and come back to her psychological evaluation one more time. She would benefit most from gastric bypass due to the acid reflux diabetes and hypertension that she has. Her upper GI shows a small to moderate hiatal hernia with reflux and I do believe bypass would be best for her in the situation. For now we will have her get a medical evaluation prior to considering surgery. We will not submit for insurance approval at this point. She will let us know when she would like to move forward.     Signed By: Karishma Dennis MD     June 16, 2020       Greater than half of the time: 30 minutes was used in counciling the patient about bariatric surgery and the steps she needs to take to move forward with her surgery. Ms. Sanam Gaspar has a reminder for a \"due or due soon\" health maintenance. I have asked that she contact her primary care provider for follow-up on this health maintenance.

## 2020-07-23 ENCOUNTER — OFFICE VISIT (OUTPATIENT)
Dept: HEMATOLOGY | Age: 65
End: 2020-07-23

## 2020-07-23 VITALS
WEIGHT: 220.8 LBS | HEIGHT: 63 IN | TEMPERATURE: 97 F | HEART RATE: 80 BPM | DIASTOLIC BLOOD PRESSURE: 62 MMHG | BODY MASS INDEX: 39.12 KG/M2 | OXYGEN SATURATION: 98 % | SYSTOLIC BLOOD PRESSURE: 137 MMHG | RESPIRATION RATE: 18 BRPM

## 2020-07-23 DIAGNOSIS — R79.89 ELEVATED LFTS: Primary | ICD-10-CM

## 2020-07-23 PROBLEM — I10 ESSENTIAL HYPERTENSION: Status: ACTIVE | Noted: 2020-07-23

## 2020-07-23 PROBLEM — K76.0 FATTY LIVER: Status: ACTIVE | Noted: 2020-07-23

## 2020-07-23 RX ORDER — VITAMIN E 268 MG
CAPSULE ORAL DAILY
COMMUNITY

## 2020-07-23 RX ORDER — CALCIUM CARBONATE 600 MG
600 TABLET ORAL 2 TIMES DAILY
COMMUNITY

## 2020-07-23 RX ORDER — FLUTICASONE PROPIONATE AND SALMETEROL 250; 50 UG/1; UG/1
1 POWDER RESPIRATORY (INHALATION) EVERY 12 HOURS
COMMUNITY

## 2020-07-23 NOTE — PROGRESS NOTES
[facility], Kobe GIBSON, Gina Sun MD Londa Prows, PAOLA Coto, [facility]-BC     Rosalinda Jimenes, [facility]   TRINH Napier, [facility]       Luciana Deputa [facility] [address]    at [address], [address] [place] [address] 22.    [phone]    FAX: [address] [place] [address], [address]    [phone]    FAX: [phone]     Patient Care Team:  Carroll Valdes MD as PCP - General (Family Medicine)  Doug De La Cruz MD (General Surgery)    Patient Active Problem List   Diagnosis Code    Severe obesity ([facility] E66.01    Fatty liver K76.0    Essential hypertension I10    Diabetes ([facility] E11.9     The clinicians listed above have asked me to see Shara Cool in consultation regarding elevated liver enzymes and its management. No medical records were available for review when the patient was here for the appointment. The patient is a 77 y.o. Black female who was told she had fatty liver and fibrosis around 2015. She is not sure of the testing performed. Serologic evaluation for markers of chronic liver disease has either not been performed or the results are not available. Imaging of the liver was either not performed or the results are not available to me. An assessment of liver fibrosis with biopsy or elastography has not been performed. The patient notes RUQ pain worse in the morning when she gets up. The patient has not experienced the following symptoms: nausea, vomiting, yellowing of the eyes or skin or swelling of the lower extremities. The patient completes all daily activities without any functional limitations.     She has seen Dr. Rena Christy for weight loss surgery options. She is not interested in pursuing surgery at this time. ASSESSMENT AND PLAN:  Elevated liver enzymes  Persistent elevation in liver transaminases of unclear etiology at this time. Serologic testing for causes of chronic liver disease was ordered. The most likely causes for the liver chemistry abnormalities were discussed with the patient and include viral hepatitis or fatty liver disease. Will perform laboratory testing to monitor liver function and degree of liver injury. This included BMP, hepatic panel, CBC with platelet count and INR. These along with the serologies will be reviewed at the next office visit. This was discussed with the patient. Will perform imaging of the liver with ultrasound. The need to perform an assessment of liver fibrosis was discussed with the patient. The FibroScan can assess liver fibrosis and determine if a patient has advanced fibrosis or cirrhosis without the need for liver biopsy. This will be performed at the next office visit. If the FibroScan suggests advanced fibrosis then a liver biopsy should be considered. The FibroScan can be repeated annually or as often as clinically indicated to assess for fibrosis progression and/or regression. Screening for hepatocellular carcinoma  HCC screening will be initiated if the patient is found to have cirrhosis. Treatment of other medical problems in patients with chronic liver disease  There are no contraindications for the patient to take most medications necessary for treatment of other medical issues. The patient can take any medications utilized for treatment of DM and/or statins to treat hypercholesterolemia. The patient does not consume alcohol on a daily basis.  Normal doses of acetaminophen, as recommended on the label of the bottle, are not hepatotoxic except in the setting of daily alcohol use, even in patients with cirrhosis and can be utilized for pain.    Obesity  She is eating less fried foods after meeting with a dietician. We will review weight loss in more detail at the next office visit. Counseling for alcohol in patients with chronic liver disease  The patient was counseled regarding alcohol consumption and the effect of alcohol on chronic liver disease. The patient does not consume any significant amount of alcohol. Vaccinations   The need for vaccination against viral hepatitis A and B will be assessed with serologic and instituted as appropriate. Routine vaccinations against other bacterial and viral agents can be performed as indicated. Annual flu vaccination should be administered if indicated. Allergies   Allergen Reactions    Chocolate Flavor Unknown (comments)    Sulfa (Sulfonamide Antibiotics) Nausea and Vomiting     Current Outpatient Medications on File Prior to Visit   Medication Sig Dispense Refill    fluticasone propion-salmeteroL (Advair Diskus) 250-50 mcg/dose diskus inhaler Take 1 Puff by inhalation every twelve (12) hours.  calcium carbonate (CALTREX) 600 mg calcium (1,500 mg) tablet Take 600 mg by mouth two (2) times a day.  vitamin E (AQUA GEMS) 400 unit capsule Take  by mouth daily.  aflibercept (EYLEA) 2 mg/0.05 mL soln Eylea 2 mg/0.05 mL intravitreal solution for injection   Inject by intraocular route.  omeprazole (PRILOSEC OTC) 20 mg tablet omeprazole 20 mg tablet,delayed release   Prescribed by non [facility] MD      albuterol (PROAIR HFA) 90 mcg/actuation inhaler ProAir HFA      amLODIPine (NORVASC) 10 mg tablet amlodipine 10 mg tablet   Take 1 tablet every day by oral route.       aspirin-calcium carbonate 81 mg-300 mg calcium(777 mg) tab Adult Low Dose Aspirin 81 mg tablet      dulaglutide (TRULICITY) 1.5 YW/3.2 mL sub-q pen Trulicity 1.5 MV/2.5 mL subcutaneous pen injector   Prescribed by non [facility] MD      diclofenac EC (VOLTAREN) 75 mg EC tablet diclofenac sodium 75 mg tablet,delayed release   Take 1 tablet twice a day by oral route.  glipiZIDE (GLUCOTROL) 5 mg tablet glipizide 5 mg tablet   Take 1 tablet twice a day by oral route.  hydroCHLOROthiazide (HYDRODIURIL) 25 mg tablet hydrochlorothiazide 25 mg tablet      insulin degludec (TRESIBA FLEXTOUCH U-100) 100 unit/mL (3 mL) inpn Tresiba FlexTouch U-100 insulin 100 unit/mL (3 mL) subcutaneous pen   Prescribed by non [facility] MD      cetirizine (ZYRTEC) 10 mg tablet Take  by mouth.  cholecalciferol (VITAMIN D3) (1000 Units /25 mcg) tablet Take 2,000 Units by mouth daily.  linaGLIPtin (TRADJENTA) 5 mg tablet Tradjenta 5 mg tablet   Take 1 tablet every day by oral route.  ramipril (ALTACE) 10 mg capsule ramipril 10 mg capsule      fluticasone propionate (FLOVENT HFA) 44 mcg/actuation inhaler Take  by inhalation.  Bifidobacterium Infantis (ALIGN) 4 mg cap Take  by mouth.  ezetimibe (ZETIA) 10 mg tablet Take 1 Tab by mouth daily. No current facility-administered medications on file prior to visit. SYSTEM REVIEW NOT RELATED TO LIVER DISEASE OR REVIEWED ABOVE:  Constitution systems: Negative for fever, chills, weight gain, weight loss. Eyes: Negative for visual changes. ENT: Negative for sore throat, painful swallowing. Respiratory: Negative for cough, hemoptysis, SOB. Cardiology: Negative for chest pain, palpitations. GI:  Negative for constipation or diarrhea. : Negative for urinary frequency, dysuria, hematuria, nocturia. Skin: Negative for rash. Hematology: Negative for easy bruising, blood clots. Musculo-skeletal: Negative for back pain, muscle pain, weakness. Neurologic: Negative for headaches, dizziness, vertigo, memory problems not related to HE. Psychology: Negative for anxiety, depression. FAMILY HISTORY:  The father  from sepsis. The mother  from CAD. There is no family history of liver disease. SOCIAL HISTORY:  The patient is .     The patient has 2 children, 2 stepchildren, grandchildren and great-grandchildren. The patient has never used tobacco products. The patient has never consumed significant amounts of alcohol but now abstains completely. The patient is retired. 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 PHYSICAL EXAMINATION:  Visit Vitals  /62 (BP 1 Location: Left arm, BP Patient Position: Sitting)   Pulse 80   Temp 97 °F (36.1 °C) (Oral)   Resp 18   Ht 5' 3\" (1.6 m)   Wt 220 lb 12.8 oz (100.2 kg)   SpO2 98%   BMI 39.11 kg/m²     General: No acute distress. Obese. Eyes: Sclera anicteric. ENT: No oral lesions. Nodes: No adenopathy. Skin: No spider angiomata. No jaundice. No palmar erythema. Respiratory: Lungs clear to auscultation. Cardiovascular: Regular heart rate. No murmurs. No JVD. Abdomen: Soft non-tender. Liver size normal to percussion/palpation. Spleen not palpable. No obvious ascites. Extremities: No edema. No muscle wasting. No gross arthritic changes. Neurologic: Alert and oriented. Cranial nerves grossly intact. No asterixis. LABORATORY STUDIES:  Recent liver function panel, CBC with platelet count and BMP are not available. These studies will be performed. SEROLOGIES:  Not available or performed. Testing was performed today. LIVER HISTOLOGY:  Not available or performed    ENDOSCOPIC PROCEDURES:  Not available or performed    RADIOLOGY:  Not available or performed    OTHER TESTING:  Not available or performed    FOLLOW-UP:  All of the issues listed above in the assessment and plan were discussed with the patient. All questions were answered. The patient expressed a clear understanding of the above. [address] [address] in 4 weeks for FibroScan, to review all data and determine the treatment plan.     John Bledsoe, [facility]-BC  L[facility] [place] [facility] [address] [place] [address], [address] [address]  22.  [phone]

## 2020-07-23 NOTE — LETTER
7/23/20 Patient: Fabrice Reza YOB: 1955 Date of Visit: 7/23/2020 Abiel Escobar MD 
[address] [id number] VIA Facsimile: [phone] Dear Abiel Escobar MD, Thank you for referring Ms. Matias Starks to [address] for evaluation. My notes for this consultation are attached. If you have questions, please do not hesitate to call me. I look forward to following your patient along with you. Sincerely, Claire Elias NP

## 2020-07-23 NOTE — PROGRESS NOTES
1. Have you been to the ER, urgent care clinic since your last visit? Hospitalized since your last visit?no    2. Have you seen or consulted any other health care providers outside of the [address] [place] [address] since your last visit? Include any pap smears or colon screening.  Dr. Krishna Chandler

## 2020-07-24 LAB
ALBUMIN SERPL-MCNC: 4.1 G/DL (ref 3.8–4.8)
ALP SERPL-CCNC: 62 IU/L (ref 39–117)
ALT SERPL-CCNC: 19 IU/L (ref 0–32)
ANA SER QL: NEGATIVE
AST SERPL-CCNC: 14 IU/L (ref 0–40)
BILIRUB DIRECT SERPL-MCNC: 0.08 MG/DL (ref 0–0.4)
BILIRUB SERPL-MCNC: <0.2 MG/DL (ref 0–1.2)
BUN SERPL-MCNC: 13 MG/DL (ref 8–27)
BUN/CREAT SERPL: 20 (ref 12–28)
C-ANCA TITR SER IF: NORMAL TITER
CALCIUM SERPL-MCNC: 9.8 MG/DL (ref 8.7–10.3)
CHLORIDE SERPL-SCNC: 102 MMOL/L (ref 96–106)
CO2 SERPL-SCNC: 24 MMOL/L (ref 20–29)
CREAT SERPL-MCNC: 0.65 MG/DL (ref 0.57–1)
ERYTHROCYTE [DISTWIDTH] IN BLOOD BY AUTOMATED COUNT: 13.6 % (ref 11.7–15.4)
FERRITIN SERPL-MCNC: 97 NG/ML (ref 15–150)
GLUCOSE SERPL-MCNC: 79 MG/DL (ref 65–99)
HAV AB SER QL IA: NEGATIVE
HBV SURFACE AB SER QL: NON REACTIVE
HCT VFR BLD AUTO: 37.5 % (ref 34–46.6)
HCV AB S/CO SERPL IA: <0.1 S/CO RATIO (ref 0–0.9)
HCV AB SERPL QL IA: NORMAL
HGB BLD-MCNC: 12.9 G/DL (ref 11.1–15.9)
INR PPP: 1 (ref 0.8–1.2)
IRON SATN MFR SERPL: 19 % (ref 15–55)
IRON SERPL-MCNC: 63 UG/DL (ref 27–139)
MCH RBC QN AUTO: 29.7 PG (ref 26.6–33)
MCHC RBC AUTO-ENTMCNC: 34.4 G/DL (ref 31.5–35.7)
MCV RBC AUTO: 86 FL (ref 79–97)
MYELOPEROXIDASE AB SER IA-ACNC: <9 U/ML (ref 0–9)
P-ANCA ATYPICAL TITR SER IF: NORMAL TITER
P-ANCA TITR SER IF: NORMAL TITER
PLATELET # BLD AUTO: 259 X10E3/UL (ref 150–450)
POTASSIUM SERPL-SCNC: 4.1 MMOL/L (ref 3.5–5.2)
PROT SERPL-MCNC: 6.8 G/DL (ref 6–8.5)
PROTEINASE3 AB SER IA-ACNC: <3.5 U/ML (ref 0–3.5)
PROTHROMBIN TIME: 10.7 SEC (ref 9.1–12)
RBC # BLD AUTO: 4.35 X10E6/UL (ref 3.77–5.28)
SODIUM SERPL-SCNC: 142 MMOL/L (ref 134–144)
TIBC SERPL-MCNC: 324 UG/DL (ref 250–450)
UIBC SERPL-MCNC: 261 UG/DL (ref 118–369)
WBC # BLD AUTO: 7.9 X10E3/UL (ref 3.4–10.8)

## 2020-07-26 LAB — MITOCHONDRIA M2 IGG SER-ACNC: <20 UNITS (ref 0–20)

## 2020-07-29 ENCOUNTER — HOSPITAL ENCOUNTER (OUTPATIENT)
Dept: ULTRASOUND IMAGING | Age: 65
Discharge: HOME | End: 2020-07-29
Attending: NURSE PRACTITIONER
Payer: OTHER GOVERNMENT

## 2020-07-29 DIAGNOSIS — R79.89 ELEVATED LFTS: ICD-10-CM

## 2020-07-29 PROCEDURE — 76700 US EXAM ABDOM COMPLETE: CPT

## 2020-08-05 NOTE — PROGRESS NOTES
No acute findings. Msg of same to pt via [address].  Will discuss at [address] [facility]

## 2020-08-06 ENCOUNTER — TELEPHONE (OUTPATIENT)
Dept: FAMILY MEDICINE CLINIC | Age: 65
End: 2020-08-06

## 2020-08-06 NOTE — TELEPHONE ENCOUNTER
----- Message from George Perdomo sent at 8/6/2020  8:49 AM EDT -----  Regarding: Dr. Sri Diaz / Darcella Holter  Appointment not available    Caller's first and last name and relationship to patient (if not the patient):  Mo Trevino       Best contact number: [phone]      Preferred date and time:   n/a      Scheduled appointment date and time: n/a      Reason for appointment: NP medical weight loss      Details to clarify the request:   Patient referred by  Dr. Indio Jorgensen  [phone]    George Perdomo

## 2020-08-18 ENCOUNTER — TELEPHONE (OUTPATIENT)
Dept: HEMATOLOGY | Age: 65
End: 2020-08-18

## 2020-09-16 ENCOUNTER — OFFICE VISIT (OUTPATIENT)
Dept: HEMATOLOGY | Age: 65
End: 2020-09-16
Payer: COMMERCIAL

## 2020-09-16 VITALS
OXYGEN SATURATION: 97 % | SYSTOLIC BLOOD PRESSURE: 134 MMHG | RESPIRATION RATE: 18 BRPM | HEART RATE: 82 BPM | DIASTOLIC BLOOD PRESSURE: 72 MMHG | BODY MASS INDEX: 39.05 KG/M2 | TEMPERATURE: 96.3 F | WEIGHT: 220.4 LBS | HEIGHT: 63 IN

## 2020-09-16 DIAGNOSIS — K76.0 FATTY LIVER: Primary | ICD-10-CM

## 2020-09-16 PROCEDURE — 91200 LIVER ELASTOGRAPHY: CPT | Performed by: NURSE PRACTITIONER

## 2020-09-16 PROCEDURE — G0463 HOSPITAL OUTPT CLINIC VISIT: HCPCS | Performed by: NURSE PRACTITIONER

## 2020-09-16 PROCEDURE — 99214 OFFICE O/P EST MOD 30 MIN: CPT | Performed by: NURSE PRACTITIONER

## 2020-09-16 NOTE — PROGRESS NOTES
[facility] hospitals, MD, [address], Cite Edgar Ayala MD Lavone Parkinson, PA-C Jeremiah Bolds, [facility]     Rosalinda Jimenes, [facility]   Jaylin Keith KATIE Reed, [facility]       Luciana Guzman Pavel [address]    at [address], [facility] Car[address] [place]  22.    [phone]    FAX: [address] [place] [address]    at [address], [address]    [phone]    FAX: [phone]     Patient Care Team:  Malini Melvin MD as PCP - General (Family Medicine)  Lili Mei MD (General Surgery)    Patient Active Problem List   Diagnosis Code    Severe obesity ([facility] E66.01    Fatty liver K76.0    Essential hypertension I10    Diabetes [facility]) E11.9       Rubén Alcantara [facility] for management of elevated liver enzymes. The active problem list, all pertinent past medical history, medications, radiologic findings and laboratory findings related to the liver disorder were reviewed with the patient. The patient is a 77 y.o. Black female who was told she had fatty liver and fibrosis around 2015. She is not sure of the testing performed. Serologic evaluation for markers of chronic liver disease was negative. Abdominal ultrasound in 7/2020 showed dense parenchyma and no masses or lesions. An assessment of liver fibrosis with elastography will be performed during this visit. The patient notes RUQ pain worse in the morning when she gets up. The patient has not experienced the following symptoms: nausea, vomiting, yellowing of the eyes or skin or swelling of the lower extremities. The patient completes all daily activities without any functional limitations.     She has seen Dr. Brooks Hubbard for weight loss surgery options. She is not interested in pursuing surgery at this time. ASSESSMENT AND PLAN:  Fatty liver disease with no fibrosis. Serologic testing for causes of chronic liver disease was negative. The most likely causes for the liver chemistry abnormalities were discussed with the patient and include viral hepatitis or fatty liver disease. Screening for hepatocellular carcinoma  HCC screening is not indicated if the patient does not have cirrhosis. Treatment of other medical problems in patients with chronic liver disease  There are no contraindications for the patient to take most medications necessary for treatment of other medical issues. The patient can take any medications utilized for treatment of DM and/or statins to treat hypercholesterolemia. The patient does not consume alcohol on a daily basis. Normal doses of acetaminophen, as recommended on the label of the bottle, are not hepatotoxic except in the setting of daily alcohol use, even in patients with cirrhosis and can be utilized for pain. Obesity  We discussed weight loss in detail. She is motivated to lose weight. Counseling for diet and weight loss in patients with confirmed or suspected NAFLD  The patient was counseled regarding diet and exercise to achieve weight loss. The best diet for patients with fatty liver is one very low in carbohydrates and enriched with protein such as an [employer] program. A handout was provided. The patient was told not to consume any food products and drinks containing fructose as this enhances hepatic fat synthesis. There is no medication or vitamin supplements that we advocate for ADAMSON. Using glitazones in patients without diabetes mellitus has been shown to reduce fat content in the liver but has no effect on fibrosis and is associated with weight gain. Vitamin E has also been used but the data is not very good and most experts no longer advocate this.       Counseling for alcohol in patients with chronic liver disease  The patient was counseled regarding alcohol consumption and the effect of alcohol on chronic liver disease. The patient does not consume any significant amount of alcohol. Vaccinations   Recommend vaccination against viral hepatitis A and B, as there is no documented immunity. Routine vaccinations against other bacterial and viral agents can be performed as indicated. Annual flu vaccination should be administered if indicated. Allergies   Allergen Reactions    Chocolate Flavor Unknown (comments)    Sulfa (Sulfonamide Antibiotics) Nausea and Vomiting     Current Outpatient Medications on File Prior to Visit   Medication Sig Dispense Refill    fluticasone propion-salmeteroL (Advair Diskus) 250-50 mcg/dose diskus inhaler Take 1 Puff by inhalation every twelve (12) hours.  calcium carbonate (CALTREX) 600 mg calcium (1,500 mg) tablet Take 600 mg by mouth two (2) times a day.  vitamin E (AQUA GEMS) 400 unit capsule Take  by mouth daily.  aflibercept (EYLEA) 2 mg/0.05 mL soln Eylea 2 mg/0.05 mL intravitreal solution for injection   Inject by intraocular route.  omeprazole (PRILOSEC OTC) 20 mg tablet omeprazole 20 mg tablet,delayed release   Prescribed by non [facility] MD      albuterol (PROAIR HFA) 90 mcg/actuation inhaler ProAir HFA      amLODIPine (NORVASC) 10 mg tablet amlodipine 10 mg tablet   Take 1 tablet every day by oral route.  aspirin-calcium carbonate 81 mg-300 mg calcium(777 mg) tab Adult Low Dose Aspirin 81 mg tablet      dulaglutide (TRULICITY) 1.5 YV/5.3 mL sub-q pen Trulicity 1.5 YB/9.1 mL subcutaneous pen injector   Prescribed by non [facility] MD      diclofenac EC (VOLTAREN) 75 mg EC tablet diclofenac sodium 75 mg tablet,delayed release   Take 1 tablet twice a day by oral route.  glipiZIDE (GLUCOTROL) 5 mg tablet glipizide 5 mg tablet   Take 1 tablet twice a day by oral route.       hydroCHLOROthiazide (HYDRODIURIL) 25 mg tablet hydrochlorothiazide 25 mg tablet      insulin degludec (TRESIBA FLEXTOUCH U-100) 100 unit/mL (3 mL) inpn Tresiba FlexTouch U-100 insulin 100 unit/mL (3 mL) subcutaneous pen   Prescribed by non [facility] MD      cetirizine (ZYRTEC) 10 mg tablet Take  by mouth.  cholecalciferol (VITAMIN D3) (1000 Units /25 mcg) tablet Take 2,000 Units by mouth daily.  Bifidobacterium Infantis (ALIGN) 4 mg cap Take  by mouth.  linaGLIPtin (TRADJENTA) 5 mg tablet Tradjenta 5 mg tablet   Take 1 tablet every day by oral route.  ramipril (ALTACE) 10 mg capsule ramipril 10 mg capsule      fluticasone propionate (FLOVENT HFA) 44 mcg/actuation inhaler Take  by inhalation.  ezetimibe (ZETIA) 10 mg tablet Take 1 Tab by mouth daily. No current facility-administered medications on file prior to visit. SYSTEM REVIEW NOT RELATED TO LIVER DISEASE OR REVIEWED ABOVE:  Constitution systems: Negative for fever, chills, weight gain, weight loss. Eyes: Negative for visual changes. ENT: Negative for sore throat, painful swallowing. Respiratory: Negative for cough, hemoptysis, SOB. Cardiology: Negative for chest pain, palpitations. GI:  Negative for constipation or diarrhea. : Negative for urinary frequency, dysuria, hematuria, nocturia. Skin: Negative for rash. Hematology: Negative for easy bruising, blood clots. Musculo-skeletal: Negative for back pain, muscle pain, weakness. Neurologic: Negative for headaches, dizziness, vertigo, memory problems not related to HE. Psychology: Negative for anxiety, depression. FAMILY HISTORY:  The father  from sepsis. The mother  from CAD. There is no family history of liver disease. SOCIAL HISTORY:  The patient is . The patient has 2 children, 2 stepchildren, grandchildren and great-grandchildren. The patient has never used tobacco products.     The patient has never consumed significant amounts of alcohol but now abstains completely. The patient is retired. 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 PHYSICAL EXAMINATION:  Visit Vitals  /72 (BP 1 Location: Right arm, BP Patient Position: Sitting)   Pulse 82   Temp (!) 96.3 °F (35.7 °C) (Tympanic)   Resp 18   Ht 5' 3\" (1.6 m)   Wt 220 lb 6.4 oz (100 kg)   SpO2 97%   BMI 39.04 kg/m²     General: No acute distress. Obese. Eyes: Sclera anicteric. ENT: No oral lesions. Nodes: No adenopathy. Skin: No spider angiomata. No jaundice. No palmar erythema. Respiratory: Lungs clear to auscultation. Cardiovascular: Regular heart rate. No murmurs. No JVD. Abdomen: Soft non-tender. Liver size normal to percussion/palpation. Spleen not palpable. No obvious ascites. Extremities: No edema. No muscle wasting. No gross arthritic changes. Neurologic: Alert and oriented. Cranial nerves grossly intact. No asterixis. LABORATORY STUDIES:  Liver [place] of [address] & Units 7/23/2020   WBC 3.4 - 10.8 x10E3/uL 7.9   HGB 11.1 - 15.9 g/dL 12.9    - 450 x10E3/uL 259   INR 0.8 - 1.2 1.0   AST 0 - 40 IU/L 14   ALT 0 - 32 IU/L 19   Alk Phos 39 - 117 IU/L 62   Bili, Total 0.0 - 1.2 mg/dL <0.2   Bili, Direct 0.00 - 0.40 mg/dL 0.08   Albumin 3.8 - 4.8 g/dL 4.1   BUN 8 - 27 mg/dL 13   Creat 0.57 - 1.00 mg/dL 0.65   Na 134 - 144 mmol/L 142   K 3.5 - 5.2 mmol/L 4.1   Cl 96 - 106 mmol/L 102   CO2 20 - 29 mmol/L 24   Glucose 65 - 99 mg/dL 79     SEROLOGIES:  Serologies Latest Ref Rng & Units 7/23/2020   Hep A Ab, Total Negative Negative   Hep B Surface AB QL  Non Reactive   Hep C Ab 0.0 - 0.9 s/co ratio <0.1   Ferritin 15 - 150 ng/mL 97   Iron % Saturation 15 - 55 % 19   DEMETRA Ab, Direct Negative Negative   C-ANCA Neg:<1:20 titer <1:20   P-ANCA Neg:<1:20 titer <1:20   ANCA Neg:<1:20 titer <1:20   M2 Ab 0.0 - 20.0 Units <20.0     LIVER HISTOLOGY:  9/2020. Fib[facility]. EkPa was 3.5. IQR/med 23%. . The results suggested a fibrosis level of F0. The CAP score suggests fatty liver. Despite her IQR, her readings were all 3-5 and consistent with F0. ENDOSCOPIC PROCEDURES:  Not available or performed    RADIOLOGY:  7/2020. Abdominal ultrasound. The liver is somewhat dense and difficult to penetrate suggesting  underlying parenchymal disease. No mass lesion is identified. Status post cholecystectomy. Otherwise unremarkable. OTHER TESTING:  Not available or performed    FOLLOW-UP:  All of the issues listed above in the assessment and plan were discussed with the patient. All questions were answered. The patient expressed a clear understanding of the above. [facility] in 2 months to assess weight loss.      Juliet Redd [facility]  Liver [facility] [address] [facility], [address] [address] [address]  22.  [phone]

## 2020-09-16 NOTE — PROGRESS NOTES
Room 5     Identified pt with two pt identifiers(name and ). Reviewed record in preparation for visit and have obtained necessary documentation. All patient medications has been reviewed. Chief Complaint   Patient presents with    Follow-up     elevated LFTS. Fibroscan. 3 most recent PHQ Screens 2020   Little interest or pleasure in doing things Several days   Feeling down, depressed, irritable, or hopeless Several days   Total Score PHQ 2 2     Abuse Screening Questionnaire 2020   Do you ever feel afraid of your partner? N   Are you in a relationship with someone who physically or mentally threatens you? N   Is it safe for you to go home? Y       Health Maintenance Due   Topic    Foot Exam Q1     A1C test (Diabetic or Prediabetic)     MICROALBUMIN Q1     Eye Exam Retinal or Dilated     Lipid Screen     DTaP/Tdap/Td series (1 - Tdap)    Shingrix Vaccine Age 49> (1 of 2)    Breast Cancer Screen Mammogram     FOBT Q1Y Age 54-65    [facility] [place] GLAUCOMA SCREENING Q2Y     Bone Densitometry (Dexa) Screening     Pneumococcal 65+ years (1 of 1 - PPSV23)    Flu Vaccine (1)       Vitals:    20 1426   BP: 134/72   Pulse: 82   Resp: 18   Temp: (!) 96.3 °F (35.7 °C)   TempSrc: Tympanic   SpO2: 97%   Weight: 220 lb 6.4 oz (100 kg)   Height: 5' 3\" (1.6 m)   PainSc:   0 - No pain       Wt Readings from Last 3 Encounters:   20 220 lb 6.4 oz (100 kg)   20 220 lb 12.8 oz (100.2 kg)   06/15/20 219 lb (99.3 kg)     Temp Readings from Last 3 Encounters:   20 (!) 96.3 °F (35.7 °C) (Tympanic)   20 97 °F (36.1 °C) (Oral)   06/15/20 99.3 °F (37.4 °C) (Oral)     BP Readings from Last 3 Encounters:   20 134/72   20 137/62   06/15/20 146/84     Pulse Readings from Last 3 Encounters:   20 82   20 80   06/15/20 76       Coordination of Care Questionnaire:   1) Have you been to an emergency room, urgent care, or hospitalized since your last visit?   no       2.  Have seen or consulted any other health care provider since your last visit? YES     8/17/2020   N[place], [address] [place] [address]       3) Do you have an Advanced Directive/ Living Will in place? NO  If yes, do we have a copy on file NO  If no, would you like information NO    Patient is accompanied by self I have received verbal consent from Alejandro Hays to discuss any/all medical information while they are present in the room.

## 2022-01-14 LAB
CREATININE, EXTERNAL: 0.67
HBA1C MFR BLD HPLC: 6.1 %
LDL-C, EXTERNAL: 136

## 2022-01-19 ENCOUNTER — OFFICE VISIT (OUTPATIENT)
Dept: ENDOCRINOLOGY | Age: 67
End: 2022-01-19
Payer: COMMERCIAL

## 2022-01-19 VITALS
DIASTOLIC BLOOD PRESSURE: 73 MMHG | SYSTOLIC BLOOD PRESSURE: 174 MMHG | HEIGHT: 63 IN | WEIGHT: 221 LBS | HEART RATE: 86 BPM | BODY MASS INDEX: 39.16 KG/M2

## 2022-01-19 DIAGNOSIS — Z79.4 TYPE 2 DIABETES MELLITUS WITH HYPERGLYCEMIA, WITH LONG-TERM CURRENT USE OF INSULIN (HCC): Primary | ICD-10-CM

## 2022-01-19 DIAGNOSIS — E78.2 MIXED HYPERLIPIDEMIA: ICD-10-CM

## 2022-01-19 DIAGNOSIS — E11.65 TYPE 2 DIABETES MELLITUS WITH HYPERGLYCEMIA, WITH LONG-TERM CURRENT USE OF INSULIN (HCC): Primary | ICD-10-CM

## 2022-01-19 PROCEDURE — 99204 OFFICE O/P NEW MOD 45 MIN: CPT | Performed by: INTERNAL MEDICINE

## 2022-01-19 RX ORDER — VITAMIN E 1000 UNIT
CAPSULE ORAL
COMMUNITY

## 2022-01-19 RX ORDER — DULAGLUTIDE 4.5 MG/.5ML
INJECTION, SOLUTION SUBCUTANEOUS
COMMUNITY
Start: 2021-12-19 | End: 2022-05-31 | Stop reason: SDUPTHER

## 2022-01-19 NOTE — PATIENT INSTRUCTIONS
Continue to lower Tresiba 5 units at a time weekly to avoid low sugars and just see if you really need as much as you are on

## 2022-01-19 NOTE — PROGRESS NOTES
Chief Complaint   Patient presents with    Diabetes       Patient was last seen: New Patient Visit - last seen by me at my old practice 8/21     General:   Has recovered from COVID - off oxygen, but not quite 100% (did mention [facility])    A1c: current a1c is 6.1    DM Medications:   Trulicity 8.1VA once a week  Tresiba  70 units daily (lowered 1 week)    Last Changes: increased trulicity, advised lower tresiba 5u at a time    Sugar Checks: checks up to 2 x day     AM: reports:      PM: reports:  if eats more starch - can get into low 200s     LOWs:  denies low sugars     DIET: feels does well with diabetic diet, has received diabetic meal training, in the past     EXERCISE: is trying to add exercise     HTN: at goal, on ACE-I, on diuretics, HTN followed by PCP     LIPIDS: not at goal, on ezetimibe, lipids followed by Cardiology  PCSK9 considered but costly and concern about side effects    RENAL: has normal renal function, has normal HIPOLITO-r , is on an ACE-I     EYES: eye exam in past year, has retinopathy     FEET: sees podiatry, has no current issues     DENTAL:  overdue for dentist     HEART:  no chest pain, shortness of breath or claudication, has no cardiac history, prior studies include: none recent    ASA:  is on aspirin     SYMPTOMS: no polyuria, thirst or blurred vision     THYROID: no known thyroid issue    DIABETES HISTORY:   Diagnosed early 2000s  On insulin most of that time  Pills were not working  Had been on metformin - GI Issues (was not ER)  No DPP4, no SGT, no GLP  Did try actos - not clear why stopped  lantus in past - was too expensive  jardiance stopped b/c cost issues -pt was also worried about the class in general  tradjenta did not help sugars  Stopped HOLLIS b/c lows and increasing trulicity    LABS/STUDIES:  12/31/14 Carotids: intimal thickening throughout -no significant stenosis; vertebral artery with antegrade flow bilaterally    PCP labs 5/8/20 BMP wnl, Chol 231/101/56/154, a1c 6.5  PCP labs 2/16/21 CMP ok, a1c 6.5, Chol 114/226/62/141  PCP labs 6/3/21 chol 225/108/67/136, GFR > 60, TSH 6.3, TSH 1.5  PCP labs 113/21 Chol 212/55/99/136, a1c 6.1, GFR > 60, ALT 14, AST 15      Past Medical History:   Diagnosis Date    Acid indigestion     Asthma     Back pain     Constipation     Depression     Diabetes (HCC)     Hypertension     Insomnia     Multiple stiff joints     Muscle ache     Nausea     Shortness of breath     Weakness     Wheezing          Employer:  Retired     Blood pressure (!) 174/73, pulse 86, height 5' 3\" (1.6 m), weight 221 lb (100.2 kg). Never has high BP - may be our machine  Weight Metrics 1/19/2022 9/16/2020 7/23/2020 6/15/2020 4/22/2020 3/4/2020 2/5/2020   Weight 221 lb 220 lb 6.4 oz 220 lb 12.8 oz 219 lb 217 lb 217 lb 219 lb   BMI 39.15 kg/m2 39.04 kg/m2 39.11 kg/m2 38.79 kg/m2 38.44 kg/m2 38.44 kg/m2 38.79 kg/m2        EXAM  - GENERAL: NCAT, Appears well nourished   - EYES: EOMI, non-icteric, no proptosis   - Ear/Nose/Throat: NCAT, no visible inflammation or masses   - CARDIOVASCULAR: no cyanosis, no visible JVD   - RESPIRATORY: respiratory effort normal without any distress or labored breathing   - MUSCULOSKELETAL: Normal ROM of neck and upper extremities observed   - SKIN: No rash on face  - NEUROLOGIC:  No facial asymmetry (Cranial nerve 7 motor function), No gaze palsy   - PSYCHIATRIC: Normal affect, Normal insight and judgement    Assessment/Plan:   1. Type 2 diabetes mellitus with hyperglycemia, with long-term current use of insulin (HCC)  Doing great  Continue to lower Tresiba 5 units at a time weekly to avoid low sugars and just see if you really need as much as you are on  No major lows since off  sulphonylurea      2. Mixed hyperlipidemia  Not at goal on zetia; can't tolerated statins  Cards considering PCSK9 but patient worried out cost and side effects      No orders of the defined types were placed in this encounter. Follow-up and Dispositions    · Return in about 4 months (around 5/19/2022).

## 2022-01-19 NOTE — LETTER
1/19/2022    Patient: Tasha Brantley   YOB: 1955   Date of Visit: 1/19/2022     Jerry Damon MD  [address] [facility] [address]  Via Fax: [phone]    Dear Jerry Damon MD,      Thank you for referring Ms. Shar Benavides to [facility] for evaluation. My notes for this consultation are attached. If you have questions, please do not hesitate to call me. I look forward to following your patient along with you.       Sincerely,    Sharonda Andrade MD

## 2022-03-18 PROBLEM — E66.01 SEVERE OBESITY (HCC): Status: ACTIVE | Noted: 2019-11-11

## 2022-03-19 PROBLEM — K76.0 FATTY LIVER: Status: ACTIVE | Noted: 2020-07-23

## 2022-03-20 PROBLEM — I10 ESSENTIAL HYPERTENSION: Status: ACTIVE | Noted: 2020-07-23

## 2022-04-19 ENCOUNTER — TELEPHONE (OUTPATIENT)
Dept: ENDOCRINOLOGY | Age: 67
End: 2022-04-19

## 2022-04-19 NOTE — TELEPHONE ENCOUNTER
Pt called 4/19 @ 1:56pm. Stated she is completely out of her lancets accu check rx and needs it refilled. Pt stated her pharmacy has been calling to try to get it filled but has not been successful.      Pharmacy: [address] [facility]    Pt# [phone]

## 2022-04-19 NOTE — TELEPHONE ENCOUNTER
I returned this call and let Ms. Clinton know that we had received any refill requests from the pharmacy. I did a script for the lancets and sent it to Dr. Juan M Bee to sign.   Jadon Sandoval

## 2022-04-19 NOTE — TELEPHONE ENCOUNTER
Pt called 4/19 @ 1:56pm. Stated she is completely out of her lancets accu check rx and needs it refilled.  Pt stated her pharmacy has been calling to try to get it filled but has not been successful.      Pharmacy: [address] [facility]     Pt# [phone]

## 2022-04-20 RX ORDER — LANCETS
EACH MISCELLANEOUS
Qty: 200 EACH | Refills: 3 | Status: SHIPPED | OUTPATIENT
Start: 2022-04-20

## 2022-05-31 RX ORDER — DULAGLUTIDE 4.5 MG/.5ML
4.5 INJECTION, SOLUTION SUBCUTANEOUS
Qty: 12 EACH | Refills: 3 | Status: SHIPPED | OUTPATIENT
Start: 2022-05-31 | End: 2022-10-13 | Stop reason: ALTCHOICE

## 2022-06-09 ENCOUNTER — OFFICE VISIT (OUTPATIENT)
Dept: ENDOCRINOLOGY | Age: 67
End: 2022-06-09
Payer: COMMERCIAL

## 2022-06-09 VITALS
DIASTOLIC BLOOD PRESSURE: 69 MMHG | SYSTOLIC BLOOD PRESSURE: 141 MMHG | HEART RATE: 79 BPM | BODY MASS INDEX: 39.15 KG/M2 | HEIGHT: 63 IN

## 2022-06-09 DIAGNOSIS — Z79.4 TYPE 2 DIABETES MELLITUS WITH HYPERGLYCEMIA, WITH LONG-TERM CURRENT USE OF INSULIN (HCC): Primary | ICD-10-CM

## 2022-06-09 DIAGNOSIS — E11.65 TYPE 2 DIABETES MELLITUS WITH HYPERGLYCEMIA, WITH LONG-TERM CURRENT USE OF INSULIN (HCC): Primary | ICD-10-CM

## 2022-06-09 DIAGNOSIS — E78.2 MIXED HYPERLIPIDEMIA: ICD-10-CM

## 2022-06-09 PROCEDURE — 1123F ACP DISCUSS/DSCN MKR DOCD: CPT | Performed by: INTERNAL MEDICINE

## 2022-06-09 PROCEDURE — 99214 OFFICE O/P EST MOD 30 MIN: CPT | Performed by: INTERNAL MEDICINE

## 2022-06-09 PROCEDURE — 3044F HG A1C LEVEL LT 7.0%: CPT | Performed by: INTERNAL MEDICINE

## 2022-06-09 NOTE — PROGRESS NOTES
Chief Complaint   Patient presents with    Diabetes       Patient was last seen: 4 months ago-5 months ago 1/19/2022     General:   Off track a bit - mostly with exercise   was in hospital for MS relapse  To go to [place] to see son     A1c: last a1c was 6.1    DM Medications:   Trulicity 7.0QX once a week  Tresiba  70 units daily     Last Changes: increased trulicity, advised lower tresiba 5u at a time    Sugar Checks: checks up to 2 x day     AM: reports:      PM: reports:  if eats more starch - can get into mid to upper 100s     LOWs:  denies low sugars - only one in 60s too long w/o eating     DIET: feels does well with diabetic diet, has received diabetic meal training, in the past     EXERCISE: is trying to add exercise - off track     HTN: at goal, on ACE-I, on diuretics, HTN followed by PCP     LIPIDS: not at goal, on ezetimibe, lipids followed by Cardiology  PCSK9 considered but costly and concern about side effects    RENAL: has normal renal function, has normal HIPOLITO-r , is on an ACE-I     EYES: eye exam in past year, has retinopathy     FEET: sees podiatry, has no current issues     DENTAL:  overdue for dentist     HEART:  no chest pain, shortness of breath or claudication, has no cardiac history, prior studies include: none recent    ASA:  is on aspirin     SYMPTOMS: no polyuria, thirst or blurred vision     THYROID: no known thyroid issue    DIABETES HISTORY:   Diagnosed early 2000s  On insulin most of that time  Pills were not working  Had been on metformin - GI Issues (was not ER)  No DPP4, no SGT, no GLP  Did try actos - not clear why stopped  lantus in past - was too expensive  jardiance stopped b/c cost issues -pt was also worried about the class in general  tradjenta did not help sugars  Stopped HOLLIS b/c lows and increasing trulicity    LABS/STUDIES:  12/31/14 Carotids: intimal thickening throughout -no significant stenosis; vertebral artery with antegrade flow bilaterally    PCP labs 5/8/20 BMP wnl, Chol 231/101/56/154, a1c 6.5  PCP labs 2/16/21 CMP ok, a1c 6.5, Chol 114/226/62/141  PCP labs 6/3/21 chol 225/108/67/136, GFR > 60, TSH 6.3, TSH 1.5  PCP labs 1/13/22 Chol 212/55/99/136, a1c 6.1, GFR > 60, ALT 14, AST 15      Past Medical History:   Diagnosis Date    Acid indigestion     Asthma     Back pain     Constipation     Depression     Diabetes (HCC)     Hypertension     Insomnia     Multiple stiff joints     Muscle ache     Nausea     Shortness of breath     Weakness     Wheezing          Employer:  Retired     Blood pressure (!) 141/69, pulse 79, height 5' 3\" (1.6 m). Weight Metrics 6/9/2022 1/19/2022 9/16/2020 7/23/2020 6/15/2020 4/22/2020 3/4/2020   Weight - 221 lb 220 lb 6.4 oz 220 lb 12.8 oz 219 lb 217 lb 217 lb   BMI 39.15 kg/m2 39.15 kg/m2 39.04 kg/m2 39.11 kg/m2 38.79 kg/m2 38.44 kg/m2 38.44 kg/m2      EXAM  - not done today    Assessment/Plan:   1. Type 2 diabetes mellitus with hyperglycemia, with long-term current use of insulin ([address]  Still doing really well  I keep telling to lower tresiba 5u at a a time, but continues to stay on same dose   Has been off track with exercise but hoping to add it back  Will get a1c with PCP next month    2. Mixed hyperlipidemia  Not at goal on zetia; can't tolerated statins  Cards considering PCSK9 but patient worried out cost and side effects    No orders of the defined types were placed in this encounter. Follow-up and Dispositions    · Return in about 4 months (around 10/9/2022).

## 2022-06-09 NOTE — LETTER
6/9/2022    Patient: Whit Cox   YOB: 1955   Date of Visit: 6/9/2022     Lisbet Renteria MD  [address] [facility] [address]  Via Fax: [phone]    Dear Lisbet Renteria MD,      Thank you for referring Ms. Krupa Pitts [facility]. My notes for this consultation are attached. If you have questions, please do not hesitate to call me. I look forward to following your patient along with you.       Sincerely,    Salas Thomas MD

## 2022-09-16 RX ORDER — INSULIN DEGLUDEC INJECTION 100 U/ML
70 INJECTION, SOLUTION SUBCUTANEOUS DAILY
Qty: 75 ML | Refills: 3 | Status: SHIPPED | OUTPATIENT
Start: 2022-09-16

## 2022-09-20 ENCOUNTER — TELEPHONE (OUTPATIENT)
Dept: ENDOCRINOLOGY | Age: 67
End: 2022-09-20

## 2022-09-20 NOTE — TELEPHONE ENCOUNTER
I returned this call and Ms. Gera Kruger said that she can only afford 30 days of medication at a time. I called [facility] and had them put that on her profile to only give her 30 days at a time.   Vamshi Hedrick

## 2022-09-20 NOTE — TELEPHONE ENCOUNTER
9/20/2022  12:01 PM      Pt called and stated her precriptions was changed from monthly to 3 months. Pt stated she can only afford one month supply. Pt wanted to know if [facility] asked Alayna to do a 3 month supply. Pt would like a one month supply with refills. Pt is out of medication. Pt pharmacy is the [facility]. Pharmacy#[phone]  QO#[phone]      Thanks,  Angie Valles

## 2022-10-13 ENCOUNTER — OFFICE VISIT (OUTPATIENT)
Dept: ENDOCRINOLOGY | Age: 67
End: 2022-10-13
Payer: COMMERCIAL

## 2022-10-13 VITALS
DIASTOLIC BLOOD PRESSURE: 75 MMHG | SYSTOLIC BLOOD PRESSURE: 137 MMHG | WEIGHT: 218 LBS | BODY MASS INDEX: 38.62 KG/M2 | HEIGHT: 63 IN | HEART RATE: 93 BPM

## 2022-10-13 DIAGNOSIS — E11.65 TYPE 2 DIABETES MELLITUS WITH HYPERGLYCEMIA, WITH LONG-TERM CURRENT USE OF INSULIN (HCC): Primary | ICD-10-CM

## 2022-10-13 DIAGNOSIS — Z79.4 TYPE 2 DIABETES MELLITUS WITH HYPERGLYCEMIA, WITH LONG-TERM CURRENT USE OF INSULIN (HCC): Primary | ICD-10-CM

## 2022-10-13 PROCEDURE — 3044F HG A1C LEVEL LT 7.0%: CPT | Performed by: INTERNAL MEDICINE

## 2022-10-13 PROCEDURE — 99214 OFFICE O/P EST MOD 30 MIN: CPT | Performed by: INTERNAL MEDICINE

## 2022-10-13 PROCEDURE — 1123F ACP DISCUSS/DSCN MKR DOCD: CPT | Performed by: INTERNAL MEDICINE

## 2022-10-13 RX ORDER — TRAMADOL HYDROCHLORIDE 50 MG/1
TABLET ORAL
COMMUNITY
Start: 2022-08-11

## 2022-10-13 RX ORDER — OMEPRAZOLE 20 MG/1
CAPSULE, DELAYED RELEASE ORAL
COMMUNITY
Start: 2022-08-29

## 2022-10-13 RX ORDER — TIRZEPATIDE 7.5 MG/.5ML
7.5 INJECTION, SOLUTION SUBCUTANEOUS
Qty: 4 PEN | Refills: 2 | Status: SHIPPED | OUTPATIENT
Start: 2022-10-13

## 2022-10-13 RX ORDER — IBANDRONATE SODIUM 150 MG/1
TABLET, FILM COATED ORAL
COMMUNITY
Start: 2022-10-06

## 2022-10-13 NOTE — PROGRESS NOTES
Chief Complaint   Patient presents with    Diabetes         Patient was last seen: 4 months ago-6/9/2022       General:   Went to [place] to see son - went well  PCP sending to [facility] for dark spots     A1c: last a1c was 6.0    DM Medications:   Trulicity 6.9SE once a week  Tresiba 68 units daily     Last Changes:  advised lower tresiba 5u at a time -did not do    Sugar Checks: checks up to 2 x day     AM: reports:      PM: reports:  if eats more starch - can get into mid to upper 100s or low 200s    LOWs:  denies low sugars      DIET: feels does well with diabetic diet, weight is down, has received diabetic meal training, in the past     EXERCISE: is trying to add exercise - off track still ([employer])    HTN: at goal, on ACE-I, on diuretics, HTN followed by PCP     LIPIDS: not at goal, on ezetimibe, lipids followed by Cardiology  PCSK9 considered but costly and concern about side effects  (no statins b/c bad aches - tried several)    RENAL: has normal renal function, has normal HIPOLITO-r , is on an ACE-I     EYES: eye exam in past year, has retinopathy     FEET: sees podiatry, has no current issues     DENTAL:  overdue for dentist     HEART:  no chest pain, shortness of breath or claudication, has no cardiac history, prior studies include: none recent (sees Cards for lipids)    ASA:  is on aspirin     SYMPTOMS: no polyuria, thirst or blurred vision     THYROID: no known thyroid issue    DIABETES HISTORY:   Diagnosed early 2000s  On insulin most of that time  Pills were not working  Had been on metformin - GI Issues (was not ER)  No DPP4, no SGT, no GLP  Did try actos - not clear why stopped  lantus in past - was too expensive  jardiance stopped b/c cost issues -pt was also worried about the class in general  tradjenta did not help sugars  Stopped HOLLIS b/c lows and increasing trulicity    LABS/STUDIES:  12/31/14 Carotids: intimal thickening throughout -no significant stenosis; vertebral artery with antegrade flow bilaterally    PCP labs 20 BMP wnl, Chol 231/101/56/154, a1c 6.5  PCP labs 21 CMP ok, a1c 6.5, Chol 114/226/62/141  PCP labs 6/3/21 chol 225/108/67/136, GFR > 60, TSH 6.3, TSH 1.5  PCP labs 22 Chol 212/55/99/136, a1c 6.1, GFR > 60, ALT 14, AST 15  PCP labs 22 a1c 6.0, Chol 208/101/65/123, GFR > 60    Past Medical History:   Diagnosis Date    Acid indigestion     Asthma     Back pain     Constipation     Depression     Diabetes (HCC)     Hypertension     Insomnia     Multiple stiff joints     Muscle ache     Nausea     Shortness of breath     Weakness     Wheezing          Employer:  Retired     Blood pressure 137/75, pulse 93, height 5' 3\" (1.6 m), weight 218 lb (98.9 kg). Weight Metrics 10/13/2022 2022 2022 2020 2020 6/15/2020 2020   Weight 218 lb - 221 lb 220 lb 6.4 oz 220 lb 12.8 oz 219 lb 217 lb   BMI 38.62 kg/m2 39.15 kg/m2 39.15 kg/m2 39.04 kg/m2 39.11 kg/m2 38.79 kg/m2 38.44 kg/m2      EXAM  - not done today    Assessment/Plan:   1. Type 2 diabetes mellitus with hyperglycemia, with long-term current use of insulin ([facility] [address]  Still doing really well  Interested in mounjaro  Stop trulicity, add mounjaro 7.5mg once a week  Advised get savings card, bring pharmacy sheet so gets run correctly  Lower tresiba to 60 units- continue to lower to keep AM > 90    2. Mixed hyperlipidemia  Not at goal on zetia; can't tolerated statins  Cards considering PCSK9 but patient worried out cost and side effects    Orders Placed This Encounter    tirzepatide (Mounjaro) 7.5 mg/0.5 mL pnij     Si.5 mg by SubCUTAneous route every seven (7) days. Dispense:  4 Pen     Refill:  2        Follow-up and Dispositions    Return in about 4 months (around 2023).

## 2022-10-13 NOTE — LETTER
10/13/2022    Patient: Travis Grider   YOB: 1955   Date of Visit: 10/13/2022     Marilyn Schmid MD  [address] [facility] [address]  Via Fax: [phone]    Dear Marilyn Schmid MD,      Thank you for referring Ms. Lacie Lagunas to [facility] for evaluation. My notes for this consultation are attached. If you have questions, please do not hesitate to call me. I look forward to following your patient along with you.       Sincerely,    Andrea Barrios MD

## 2022-10-19 ENCOUNTER — TELEPHONE (OUTPATIENT)
Dept: ENDOCRINOLOGY | Age: 67
End: 2022-10-19

## 2022-10-19 NOTE — TELEPHONE ENCOUNTER
10/19/2022  3:48 PM    Pt called and stated that she needs refill on her acucheck test strips and pharmacy stated that she needed to come into the office for appt, but she was just there last week. She also needs a prior auth for her new script of aneudy sent to [facility] on [address] according to [employer].  Please call her back at [phone]

## 2022-10-20 ENCOUNTER — DOCUMENTATION ONLY (OUTPATIENT)
Dept: ENDOCRINOLOGY | Age: 67
End: 2022-10-20

## 2022-10-20 ENCOUNTER — TELEPHONE (OUTPATIENT)
Dept: ENDOCRINOLOGY | Age: 67
End: 2022-10-20

## 2022-10-20 RX ORDER — BLOOD SUGAR DIAGNOSTIC
STRIP MISCELLANEOUS
Qty: 200 STRIP | Refills: 3 | Status: SHIPPED | OUTPATIENT
Start: 2022-10-20

## 2022-10-20 NOTE — TELEPHONE ENCOUNTER
10/20/2022    Pt called and left a vm at 3:29 pm stating she is having a problem with [facility] regarding Mourjaro. Pharmacy adv pt her insurance rejected the PA. Pt can be reached [phone].     Thanks,   Landon Sanchez

## 2022-10-20 NOTE — TELEPHONE ENCOUNTER
Script sent to Dr. Rikki Carlson to sign and PA has been started for the [address] [place] [address]

## 2022-10-21 NOTE — TELEPHONE ENCOUNTER
10/21/2022    Pt called and left a vm at 11:10 am stating she is still waiting for a return for call regarding the message below. Pt can be reached at [phone].     Thanks,   Donis Arce

## 2022-10-24 NOTE — TELEPHONE ENCOUNTER
10/24/2022    Pt called and left a vm at 10:03 am stating she received a called from the nurse on Friday but she is still having trouble with [employer]. Pt stated she is out of trulicity and would like to know what to do. She is also having problems with getting her test strips filled. Pt can be reached at [phone].     Thanks,   Gaby Catching

## 2022-10-26 NOTE — TELEPHONE ENCOUNTER
Pt called 10/26 @ 10:04 AM.    Pt stated she is completely out of her trulisity. She is supposed to switch to [facility] due to a PA not being approved. She needs her trulisity refilled, but [facility] and needs to order it or [facility] she also needs her test strip prescription fixed. She said we approved the wrong type of test strips. She is supposed to have accu chek guide test strips so she is unable to check her sugars at the moment.     AV#[phone]

## 2022-10-27 ENCOUNTER — TELEPHONE (OUTPATIENT)
Dept: ENDOCRINOLOGY | Age: 67
End: 2022-10-27

## 2022-10-27 RX ORDER — BLOOD SUGAR DIAGNOSTIC
STRIP MISCELLANEOUS
Qty: 200 STRIP | Refills: 3 | OUTPATIENT
Start: 2022-10-27

## 2022-10-27 RX ORDER — BLOOD SUGAR DIAGNOSTIC
STRIP MISCELLANEOUS
Qty: 200 STRIP | Refills: 3 | Status: SHIPPED | OUTPATIENT
Start: 2022-10-27

## 2022-10-27 NOTE — TELEPHONE ENCOUNTER
10/27/2022    Clara from [address] called and left a vm at 2:52 pm stating she need a RX for Accu Check Guide Test Strips. They have sent over a request and haven't heard from the provider's office. They can be reached at [phone].     Thanks,   Nancy Jean

## 2022-11-17 RX ORDER — PEN NEEDLE, DIABETIC 31 GX5/16"
NEEDLE, DISPOSABLE MISCELLANEOUS
Qty: 100 PEN NEEDLE | Refills: 3 | Status: SHIPPED | OUTPATIENT
Start: 2022-11-17

## 2022-12-06 ENCOUNTER — TELEPHONE (OUTPATIENT)
Dept: ENDOCRINOLOGY | Age: 67
End: 2022-12-06

## 2022-12-06 NOTE — TELEPHONE ENCOUNTER
12/6/2022    Pt called and left a voicemail at 10:54 am stating she's unable to get Yakov Loera refilled. The pharmacy adv Yakov Loera is on back order. Pt has been contacting other pharmacies. Pt is still taking Althia Powell, her blood sugars haven't been high. She wanted to confirm if its ok to just take Althia Powell instead of both. Pt can be reached at [phone].     Thanks,  Shearon Spurling

## 2022-12-06 NOTE — TELEPHONE ENCOUNTER
Check if they have other doses - we can go up to the 10mg; or we can lower to 5mg until the 7.5 is available

## 2022-12-07 RX ORDER — TIRZEPATIDE 5 MG/.5ML
5 INJECTION, SOLUTION SUBCUTANEOUS
Qty: 1 BOX | Refills: 2 | Status: SHIPPED | OUTPATIENT
Start: 2022-12-07

## 2022-12-07 NOTE — TELEPHONE ENCOUNTER
Was able to call around and found a store on [address] who had one box of the 5 mg dose. I have done a script and sent it to Dr. Moss Crew to sign.  Patient is aware that we needed to change pharmacy's  Vish Tesfaye

## 2022-12-15 NOTE — TELEPHONE ENCOUNTER
Health Maintenance, Female  Adopting a healthy lifestyle and getting preventive care can go a long way to promote health and wellness. Talk with your health care provider about what schedule of regular examinations is right for you. This is a good chance for you to check in with your provider about disease prevention and staying healthy.  In between checkups, there are plenty of things you can do on your own. Experts have done a lot of research about which lifestyle changes and preventive measures are most likely to keep you healthy. Ask your health care provider for more information.  Weight and diet  Eat a healthy diet  Be sure to include plenty of vegetables, fruits, low-fat dairy products, and lean protein.  Do not eat a lot of foods high in solid fats, added sugars, or salt.  Get regular exercise. This is one of the most important things you can do for your health.  Most adults should exercise for at least 150 minutes each week. The exercise should increase your heart rate and make you sweat (moderate-intensity exercise).  Most adults should also do strengthening exercises at least twice a week. This is in addition to the moderate-intensity exercise.     Maintain a healthy weight  Body mass index (BMI) is a measurement that can be used to identify possible weight problems. It estimates body fat based on height and weight. Your health care provider can help determine your BMI and help you achieve or maintain a healthy weight.  For females 20 years of age and older:  A BMI below 18.5 is considered underweight.  A BMI of 18.5 to 24.9 is normal.  A BMI of 25 to 29.9 is considered overweight.  A BMI of 30 and above is considered obese.     Watch levels of cholesterol and blood lipids  You should start having your blood tested for lipids and cholesterol at 20 years of age, then have this test every 5 years.  You may need to have your cholesterol levels checked more often if:  Your lipid or cholesterol levels are  I returned this call and asked Ms. Meredith Hoffman to bring the co pay card back to the pharmacy as when I called them, they said they did not have any of that information. She said she would do that today. I also let her know that I redid the script for the Accu Chek Guide strips.   Barb Mcneil high.  You are older than 50 years of age.  You are at high risk for heart disease.     Cancer screening  Lung Cancer  Lung cancer screening is recommended for adults 55-80 years old who are at high risk for lung cancer because of a history of smoking.  A yearly low-dose CT scan of the lungs is recommended for people who:  Currently smoke.  Have quit within the past 15 years.  Have at least a 30-pack-year history of smoking. A pack year is smoking an average of one pack of cigarettes a day for 1 year.  Yearly screening should continue until it has been 15 years since you quit.  Yearly screening should stop if you develop a health problem that would prevent you from having lung cancer treatment.     Breast Cancer  Practice breast self-awareness. This means understanding how your breasts normally appear and feel.  It also means doing regular breast self-exams. Let your health care provider know about any changes, no matter how small.  If you are in your 20s or 30s, you should have a clinical breast exam (CBE) by a health care provider every 1-3 years as part of a regular health exam.  If you are 40 or older, have a CBE every year. Also consider having a breast X-ray (mammogram) every year.  If you have a family history of breast cancer, talk to your health care provider about genetic screening.  If you are at high risk for breast cancer, talk to your health care provider about having an MRI and a mammogram every year.  Breast cancer gene (BRCA) assessment is recommended for women who have family members with BRCA-related cancers. BRCA-related cancers include:  Breast.  Ovarian.  Tubal.  Peritoneal cancers.  Results of the assessment will determine the need for genetic counseling and BRCA1 and BRCA2 testing.     Cervical Cancer  Your health care provider may recommend that you be screened regularly for cancer of the pelvic organs (ovaries, uterus, and vagina). This screening involves a pelvic examination, including  checking for microscopic changes to the surface of your cervix (Pap test). You may be encouraged to have this screening done every 3 years, beginning at age 21.  For women ages 30-65, health care providers may recommend pelvic exams and Pap testing every 3 years, or they may recommend the Pap and pelvic exam, combined with testing for human papilloma virus (HPV), every 5 years. Some types of HPV increase your risk of cervical cancer. Testing for HPV may also be done on women of any age with unclear Pap test results.  Other health care providers may not recommend any screening for nonpregnant women who are considered low risk for pelvic cancer and who do not have symptoms. Ask your health care provider if a screening pelvic exam is right for you.  If you have had past treatment for cervical cancer or a condition that could lead to cancer, you need Pap tests and screening for cancer for at least 20 years after your treatment. If Pap tests have been discontinued, your risk factors (such as having a new sexual partner) need to be reassessed to determine if screening should resume. Some women have medical problems that increase the chance of getting cervical cancer. In these cases, your health care provider may recommend more frequent screening and Pap tests.     Colorectal Cancer  This type of cancer can be detected and often prevented.  Routine colorectal cancer screening usually begins at 50 years of age and continues through 75 years of age.  Your health care provider may recommend screening at an earlier age if you have risk factors for colon cancer.  Your health care provider may also recommend using home test kits to check for hidden blood in the stool.  A small camera at the end of a tube can be used to examine your colon directly (sigmoidoscopy or colonoscopy). This is done to check for the earliest forms of colorectal cancer.  Routine screening usually begins at age 50.  Direct examination of the colon should  be repeated every 5-10 years through 75 years of age. However, you may need to be screened more often if early forms of precancerous polyps or small growths are found.     Skin Cancer  Check your skin from head to toe regularly.  Tell your health care provider about any new moles or changes in moles, especially if there is a change in a mole's shape or color.  Also tell your health care provider if you have a mole that is larger than the size of a pencil eraser.  Always use sunscreen. Apply sunscreen liberally and repeatedly throughout the day.  Protect yourself by wearing long sleeves, pants, a wide-brimmed hat, and sunglasses whenever you are outside.     Heart disease, diabetes, and high blood pressure  High blood pressure causes heart disease and increases the risk of stroke. High blood pressure is more likely to develop in:  People who have blood pressure in the high end of the normal range (130-139/85-89 mm Hg).  People who are overweight or obese.  People who are .  If you are 18-39 years of age, have your blood pressure checked every 3-5 years. If you are 40 years of age or older, have your blood pressure checked every year. You should have your blood pressure measured twice--once when you are at a hospital or clinic, and once when you are not at a hospital or clinic. Record the average of the two measurements. To check your blood pressure when you are not at a hospital or clinic, you can use:  An automated blood pressure machine at a pharmacy.  A home blood pressure monitor.  If you are between 55 years and 79 years old, ask your health care provider if you should take aspirin to prevent strokes.  Have regular diabetes screenings. This involves taking a blood sample to check your fasting blood sugar level.  If you are at a normal weight and have a low risk for diabetes, have this test once every three years after 45 years of age.  If you are overweight and have a high risk for diabetes,  consider being tested at a younger age or more often.  Preventing infection  Hepatitis B  If you have a higher risk for hepatitis B, you should be screened for this virus. You are considered at high risk for hepatitis B if:  You were born in a country where hepatitis B is common. Ask your health care provider which countries are considered high risk.  Your parents were born in a high-risk country, and you have not been immunized against hepatitis B (hepatitis B vaccine).  You have HIV or AIDS.  You use needles to inject street drugs.  You live with someone who has hepatitis B.  You have had sex with someone who has hepatitis B.  You get hemodialysis treatment.  You take certain medicines for conditions, including cancer, organ transplantation, and autoimmune conditions.     Hepatitis C  Blood testing is recommended for:  Everyone born from 1945 through 1965.  Anyone with known risk factors for hepatitis C.     Sexually transmitted infections (STIs)  You should be screened for sexually transmitted infections (STIs) including gonorrhea and chlamydia if:  You are sexually active and are younger than 24 years of age.  You are older than 24 years of age and your health care provider tells you that you are at risk for this type of infection.  Your sexual activity has changed since you were last screened and you are at an increased risk for chlamydia or gonorrhea. Ask your health care provider if you are at risk.  If you do not have HIV, but are at risk, it may be recommended that you take a prescription medicine daily to prevent HIV infection. This is called pre-exposure prophylaxis (PrEP). You are considered at risk if:  You are sexually active and do not regularly use condoms or know the HIV status of your partner(s).  You take drugs by injection.  You are sexually active with a partner who has HIV.     Talk with your health care provider about whether you are at high risk of being infected with HIV. If you choose to  begin PrEP, you should first be tested for HIV. You should then be tested every 3 months for as long as you are taking PrEP.  Pregnancy  If you are premenopausal and you may become pregnant, ask your health care provider about preconception counseling.  If you may become pregnant, take 400 to 800 micrograms (mcg) of folic acid every day.  If you want to prevent pregnancy, talk to your health care provider about birth control (contraception).  Osteoporosis and menopause  Osteoporosis is a disease in which the bones lose minerals and strength with aging. This can result in serious bone fractures. Your risk for osteoporosis can be identified using a bone density scan.  If you are 65 years of age or older, or if you are at risk for osteoporosis and fractures, ask your health care provider if you should be screened.  Ask your health care provider whether you should take a calcium or vitamin D supplement to lower your risk for osteoporosis.  Menopause may have certain physical symptoms and risks.  Hormone replacement therapy may reduce some of these symptoms and risks.  Talk to your health care provider about whether hormone replacement therapy is right for you.  Follow these instructions at home:  Schedule regular health, dental, and eye exams.  Stay current with your immunizations.  Do not use any tobacco products including cigarettes, chewing tobacco, or electronic cigarettes.  If you are pregnant, do not drink alcohol.  If you are breastfeeding, limit how much and how often you drink alcohol.  Limit alcohol intake to no more than 1 drink per day for nonpregnant women. One drink equals 12 ounces of beer, 5 ounces of wine, or 1½ ounces of hard liquor.  Do not use street drugs.  Do not share needles.  Ask your health care provider for help if you need support or information about quitting drugs.  Tell your health care provider if you often feel depressed.  Tell your health care provider if you have ever been abused or do  not feel safe at home.  This information is not intended to replace advice given to you by your health care provider. Make sure you discuss any questions you have with your health care provider.  Document Released: 07/02/2012 Document Revised: 05/25/2017 Document Reviewed: 09/20/2016  [employer] Interactive Patient Education © 2018 [employer]         Healthy Delicious Recipes from [employer]: https://[employer]/  [place] Plant Based Meal Recipes: https://Rx Network/  Heart Healthy Recipes from [employer]: https://abcardio.org/wp-content/uploads/2020/06/ABC_Cookbook.pdf  [place] Healthy Living Guide: https://www.[facility].[employer].edu/nutritionsource/2022/01/06/healthy-living-guide-7596-5352/  SNAP Cookbook for Budgets: http://[employer].net/dss/documents/good-and-cheap.pdf

## 2023-01-17 RX ORDER — INSULIN DEGLUDEC INJECTION 100 U/ML
60 INJECTION, SOLUTION SUBCUTANEOUS DAILY
Qty: 18 ML | Refills: 4 | Status: SHIPPED | OUTPATIENT
Start: 2023-01-17 | End: 2023-02-16

## 2023-01-17 NOTE — TELEPHONE ENCOUNTER
Pt stated that at her last appt, Dr Miguel Quintana decreased her Mozelle Genin dose to 60 units but the change in dose was never sent to her pharmacy. Pt believes that this is the reason why her copay has increased to $200.   Pt will like a new rx sent to her pharmacy with the dose of her insulin changed to 60 units

## 2023-01-17 NOTE — TELEPHONE ENCOUNTER
1/17/2023  9:01 AM      Patient called and left a voice mail at 8:58 am.Patient stated she is having a problem with [facility] on [address] they are trying to charge her two hundred and something dollars  for her tresiba insulin and she had a coupon that would reduce the medication to $50 but they are having a problem with the coupon. Pt would like for us to call her back or [facility] to get this resolved. [phone]      Thanks,  Newton Martin

## 2023-02-25 DIAGNOSIS — E11.65 TYPE 2 DIABETES MELLITUS WITH HYPERGLYCEMIA, WITH LONG-TERM CURRENT USE OF INSULIN (HCC): ICD-10-CM

## 2023-02-25 DIAGNOSIS — Z79.4 TYPE 2 DIABETES MELLITUS WITH HYPERGLYCEMIA, WITH LONG-TERM CURRENT USE OF INSULIN (HCC): ICD-10-CM

## 2023-02-25 RX ORDER — TIRZEPATIDE 7.5 MG/.5ML
INJECTION, SOLUTION SUBCUTANEOUS
Qty: 2 UNSPECIFIED | Refills: 2 | Status: SHIPPED | OUTPATIENT
Start: 2023-02-25

## 2023-03-08 ENCOUNTER — TELEPHONE (OUTPATIENT)
Dept: ENDOCRINOLOGY | Age: 68
End: 2023-03-08

## 2023-03-08 NOTE — TELEPHONE ENCOUNTER
I received a voice mail from Ms. Daniel Post. She said she is having a really hard time finding either the 7.5 or the 5mg of Mounjaro. She is wondering is she could be switched to something else that may not be on back order.   Reji Dutta

## 2023-03-23 ENCOUNTER — DOCUMENTATION ONLY (OUTPATIENT)
Dept: ENDOCRINOLOGY | Age: 68
End: 2023-03-23

## 2023-04-27 ENCOUNTER — OFFICE VISIT (OUTPATIENT)
Dept: ENDOCRINOLOGY | Age: 68
End: 2023-04-27
Payer: COMMERCIAL

## 2023-04-27 VITALS — HEIGHT: 63 IN | BODY MASS INDEX: 38.62 KG/M2

## 2023-04-27 DIAGNOSIS — Z79.4 TYPE 2 DIABETES MELLITUS WITH HYPERGLYCEMIA, WITH LONG-TERM CURRENT USE OF INSULIN (HCC): Primary | ICD-10-CM

## 2023-04-27 DIAGNOSIS — E11.65 TYPE 2 DIABETES MELLITUS WITH HYPERGLYCEMIA, WITH LONG-TERM CURRENT USE OF INSULIN (HCC): Primary | ICD-10-CM

## 2023-04-27 PROCEDURE — 99214 OFFICE O/P EST MOD 30 MIN: CPT | Performed by: INTERNAL MEDICINE

## 2023-04-27 PROCEDURE — 1123F ACP DISCUSS/DSCN MKR DOCD: CPT | Performed by: INTERNAL MEDICINE

## 2023-04-27 NOTE — PROGRESS NOTES
Chief Complaint   Patient presents with    Diabetes       Patient was last seen: 6 months ago-10/13/2022     Wants to stay on mounjaro but cost went from $25 to $150   Also Tresiba now $99    General:   PCP moving to [facility] - Dr Malina Gutierres will be new one    A1c: last a1c was 6.0    DM Medications:   Moujaro 7.5mg  - out for a month   Tresiba 68 units daily     Last Changes:  lower tresiba    Sugar Checks: checks up to 2 x day     AM: reports:  76    PM: reports:  upper 100s    LOWs:  has low sugars in AM      DIET: feels does well with diabetic diet, weight is down, has received diabetic meal training, in the past     EXERCISE: is trying to add exercise - off track still ([employer])    HTN: at goal, on ACE-I, on diuretics, HTN followed by PCP     LIPIDS: not at goal, on ezetimibe, lipids followed by Cardiology  PCSK9 considered but costly and concern about side effects  (no statins b/c bad aches - tried several)    RENAL: has normal renal function, has normal HIPOLITO-r , is on an ACE-I     EYES: eye exam in past year, has retinopathy     FEET: sees podiatry, has no current issues     DENTAL:  has seen dentist in past year     HEART:  no chest pain, shortness of breath or claudication, has no cardiac history, prior studies include: none recent (sees Cards for lipids)    ASA:  is on aspirin     SYMPTOMS: no polyuria, thirst or blurred vision     THYROID: no known thyroid issue    DIABETES HISTORY:   Diagnosed early 2000s  On insulin most of that time  Pills were not working  Had been on metformin - GI Issues (was not ER)  No DPP4, no SGT, no GLP  Did try actos - not clear why stopped  lantus in past - was too expensive  jardiance stopped b/c cost issues -pt was also worried about the class in general  tradjenta did not help sugars  Stopped HOLLIS b/c lows and increasing trulicity    LABS/STUDIES:  12/31/14 Carotids: intimal thickening throughout -no significant stenosis; vertebral artery with antegrade flow bilaterally    PCP labs 5/8/20 BMP wnl, Chol 231/101/56/154, a1c 6.5  PCP labs 2/16/21 CMP ok, a1c 6.5, Chol 114/226/62/141  PCP labs 6/3/21 chol 225/108/67/136, GFR > 60, TSH 6.3, TSH 1.5  PCP labs 1/13/22 Chol 212/55/99/136, a1c 6.1, GFR > 60, ALT 14, AST 15  PCP labs 8/9/22 a1c 6.0, Chol 208/101/65/123, GFR > 60  PCP labs 3/10/23 200/67/84/115, Cr 0.7, TSH 0.9    Past Medical History:   Diagnosis Date    Acid indigestion     Asthma     Back pain     Constipation     Depression     Diabetes (HCC)     Hypertension     Insomnia     Multiple stiff joints     Muscle ache     Nausea     Shortness of breath     Weakness     Wheezing          Employer:  Retired     Height 5' 3\" (1.6 m). Weight Metrics 4/27/2023 10/13/2022 6/9/2022 1/19/2022 9/16/2020 7/23/2020 6/15/2020   Weight - 218 lb - 221 lb 220 lb 6.4 oz 220 lb 12.8 oz 219 lb   BMI 38.62 kg/m2 38.62 kg/m2 39.15 kg/m2 39.15 kg/m2 39.04 kg/m2 39.11 kg/m2 38.79 kg/m2      EXAM  - not done today    Assessment/Plan:   1. Type 2 diabetes mellitus with hyperglycemia, with long-term current use of insulin (HCC)  Mounjaro was $25, now $150 so may need to switch; new coupon give to see if works  Lower tresiba to 60 units once back on moujaro- continue to lower to keep AM > 90  Gave tresiba savings coupon too (explained for both of these I just got of the internet and she can do too)  Consider ACE    2. Mixed hyperlipidemia  Not at goal on zetia; can't tolerated statins  Cards considering PCSK9 but patient worried out cost and side effects so not done yet    Orders Placed This Encounter    HEMOGLOBIN A1C WITH EAG    MICROALBUMIN, UR, RAND W/ MICROALB/CREAT RATIO        Follow-up and Dispositions    Return in about 4 months (around 8/27/2023).

## 2023-05-17 RX ORDER — TIRZEPATIDE 7.5 MG/.5ML
INJECTION, SOLUTION SUBCUTANEOUS
Qty: 4 ML | Refills: 0 | Status: SHIPPED | OUTPATIENT
Start: 2023-05-17

## 2023-05-19 RX ORDER — ALBUTEROL SULFATE 90 UG/1
AEROSOL, METERED RESPIRATORY (INHALATION)
COMMUNITY

## 2023-05-19 RX ORDER — AMLODIPINE BESYLATE 10 MG/1
TABLET ORAL
COMMUNITY

## 2023-05-19 RX ORDER — IBANDRONATE SODIUM 150 MG/1
TABLET, FILM COATED ORAL
COMMUNITY
Start: 2022-10-06

## 2023-05-19 RX ORDER — HYDROCHLOROTHIAZIDE 25 MG/1
TABLET ORAL
COMMUNITY
Start: 2016-02-24

## 2023-05-19 RX ORDER — EZETIMIBE 10 MG/1
1 TABLET ORAL DAILY
COMMUNITY

## 2023-05-19 RX ORDER — TRAMADOL HYDROCHLORIDE 50 MG/1
TABLET ORAL
COMMUNITY
Start: 2022-08-11

## 2023-05-19 RX ORDER — TIRZEPATIDE 7.5 MG/.5ML
INJECTION, SOLUTION SUBCUTANEOUS
COMMUNITY
Start: 2023-02-25 | End: 2023-06-19 | Stop reason: DRUGHIGH

## 2023-05-19 RX ORDER — OMEPRAZOLE 20 MG/1
TABLET, DELAYED RELEASE ORAL
COMMUNITY
Start: 2018-01-22

## 2023-05-19 RX ORDER — TIRZEPATIDE 5 MG/.5ML
5 INJECTION, SOLUTION SUBCUTANEOUS
COMMUNITY
Start: 2022-12-07 | End: 2023-06-19 | Stop reason: DRUGHIGH

## 2023-05-19 RX ORDER — CETIRIZINE HYDROCHLORIDE 10 MG/1
TABLET ORAL
COMMUNITY

## 2023-05-19 RX ORDER — ALUMINUM ZIRCONIUM OCTACHLOROHYDREX GLY 16 G/100G
GEL TOPICAL
COMMUNITY

## 2023-05-19 RX ORDER — OMEPRAZOLE 20 MG/1
CAPSULE, DELAYED RELEASE ORAL
COMMUNITY
Start: 2022-08-29

## 2023-05-19 RX ORDER — DICLOFENAC SODIUM 75 MG/1
TABLET, DELAYED RELEASE ORAL
COMMUNITY
Start: 2017-04-10

## 2023-06-19 RX ORDER — TIRZEPATIDE 7.5 MG/.5ML
INJECTION, SOLUTION SUBCUTANEOUS
Qty: 4 ML | Refills: 0 | Status: SHIPPED | OUTPATIENT
Start: 2023-06-19

## 2023-07-19 RX ORDER — TIRZEPATIDE 7.5 MG/.5ML
INJECTION, SOLUTION SUBCUTANEOUS
Qty: 4 ML | Refills: 0 | Status: SHIPPED | OUTPATIENT
Start: 2023-07-19

## 2023-08-09 ENCOUNTER — TELEPHONE (OUTPATIENT)
Age: 68
End: 2023-08-09

## 2023-08-09 DIAGNOSIS — E11.65 TYPE 2 DIABETES MELLITUS WITH HYPERGLYCEMIA, WITH LONG-TERM CURRENT USE OF INSULIN (HCC): Primary | ICD-10-CM

## 2023-08-09 DIAGNOSIS — Z79.4 TYPE 2 DIABETES MELLITUS WITH HYPERGLYCEMIA, WITH LONG-TERM CURRENT USE OF INSULIN (HCC): Primary | ICD-10-CM

## 2023-08-09 DIAGNOSIS — E78.2 MIXED HYPERLIPIDEMIA: ICD-10-CM

## 2023-08-09 NOTE — TELEPHONE ENCOUNTER
8/9/2023    Pt called and left a vm at 2:20 pm stating she misplaced her lab work that she needed to do at [facility]. She think it was for A1C. She would like the labs sent to [facility] on [address] [place]. Pt would like a call back letting her know it was done. Pt can be reached at [phone].     Thanks,   Krystal Medrano

## 2023-08-10 DIAGNOSIS — E11.65 TYPE 2 DIABETES MELLITUS WITH HYPERGLYCEMIA, WITH LONG-TERM CURRENT USE OF INSULIN (HCC): ICD-10-CM

## 2023-08-10 DIAGNOSIS — Z79.4 TYPE 2 DIABETES MELLITUS WITH HYPERGLYCEMIA, WITH LONG-TERM CURRENT USE OF INSULIN (HCC): ICD-10-CM

## 2023-08-10 DIAGNOSIS — E78.2 MIXED HYPERLIPIDEMIA: ICD-10-CM

## 2023-08-10 NOTE — TELEPHONE ENCOUNTER
Spoke with pt and she requested to have her lab orders faxed to [address] [place] [address]   at [phone]. Orders were faxed to the requested number.

## 2023-08-17 ENCOUNTER — TELEPHONE (OUTPATIENT)
Age: 68
End: 2023-08-17

## 2023-08-17 DIAGNOSIS — E27.40 ADRENAL INSUFFICIENCY (HCC): Primary | ICD-10-CM

## 2023-08-17 DIAGNOSIS — E78.2 MIXED HYPERLIPIDEMIA: ICD-10-CM

## 2023-08-17 DIAGNOSIS — E11.65 TYPE 2 DIABETES MELLITUS WITH HYPERGLYCEMIA, WITH LONG-TERM CURRENT USE OF INSULIN (HCC): ICD-10-CM

## 2023-08-17 DIAGNOSIS — Z79.4 TYPE 2 DIABETES MELLITUS WITH HYPERGLYCEMIA, WITH LONG-TERM CURRENT USE OF INSULIN (HCC): ICD-10-CM

## 2023-08-17 NOTE — TELEPHONE ENCOUNTER
8/17/2023  9:28 AM    Patient called and stated she is at [id number] [facility] and she need her lab orders faxed over to [address] [place] [address].     [address]    Thanks,  Alem Marrero

## 2023-08-18 LAB
ALB/GLOBRATIO, 58C: 1.6 (CALC) (ref 1–2.5)
ALBUMIN SERPL-MCNC: 4 G/DL (ref 3.6–5.1)
ALKALINE PHOSPHATASE, TOTAL, 25002000: 53 U/L (ref 37–153)
ALT SERPL-CCNC: 17 U/L (ref 6–29)
AST SERPL W P-5'-P-CCNC: 15 U/L (ref 10–35)
BILIRUB SERPL-MCNC: 0.4 MG/DL (ref 0.2–1.2)
BUN SERPL-MCNC: 15 MG/DL (ref 7–25)
BUN/CREATININE RATIO,BUCR: ABNORMAL (CALC) (ref 6–22)
CALCIUM SERPL-MCNC: 9.6 MG/DL (ref 8.6–10.4)
CHLORIDE SERPL-SCNC: 106 MMOL/L (ref 98–110)
CHOL/HDL RATIO,CHHDX: 3.3 (CALC)
CHOLEST SERPL-MCNC: 206 MG/DL
CO2 SERPL-SCNC: 30 MMOL/L (ref 20–32)
CREAT SERPL-MCNC: 0.75 MG/DL (ref 0.6–1)
CREATININE URINE,9612018: 165 MG/DL (ref 20–275)
EGFR: 86 ML/MIN/1.73M2
GLOBULIN,GLOB: 2.5 G/DL (CALC) (ref 1.9–3.7)
GLUCOSE SERPL-MCNC: 62 MG/DL (ref 65–99)
HBA1C MFR BLD HPLC: 5.6 % OF TOTAL HGB
HDLC SERPL-MCNC: 63 MG/DL
LDL-CHOLESTEROL: 122 MG/DL (CALC)
MICROALBUMIN,URINE RANDOM 140054: 1.2 MG/DL
MICROALBUMIN/CREAT RATIO: 7 MCG/MG CREAT
NON-HDL CHOLESTEROL, 011976: 143 MG/DL (CALC)
POTASSIUM SERPL-SCNC: 4 MMOL/L (ref 3.5–5.3)
PROT SERPL-MCNC: 6.5 G/DL (ref 6.1–8.1)
SODIUM SERPL-SCNC: 142 MMOL/L (ref 135–146)
TRIGL SERPL-MCNC: 100 MG/DL (ref ?–150)

## 2023-08-21 RX ORDER — TIRZEPATIDE 7.5 MG/.5ML
INJECTION, SOLUTION SUBCUTANEOUS
Qty: 4 ML | Refills: 0 | Status: SHIPPED | OUTPATIENT
Start: 2023-08-21 | End: 2023-08-25 | Stop reason: DRUGHIGH

## 2023-08-25 ENCOUNTER — OFFICE VISIT (OUTPATIENT)
Age: 68
End: 2023-08-25
Payer: COMMERCIAL

## 2023-08-25 VITALS
HEART RATE: 80 BPM | DIASTOLIC BLOOD PRESSURE: 77 MMHG | HEIGHT: 63 IN | SYSTOLIC BLOOD PRESSURE: 125 MMHG | BODY MASS INDEX: 38.45 KG/M2 | WEIGHT: 217 LBS

## 2023-08-25 DIAGNOSIS — E11.319 TYPE 2 DIABETES MELLITUS WITH RETINOPATHY WITHOUT MACULAR EDEMA, WITH LONG-TERM CURRENT USE OF INSULIN, UNSPECIFIED LATERALITY, UNSPECIFIED RETINOPATHY SEVERITY (HCC): ICD-10-CM

## 2023-08-25 DIAGNOSIS — Z79.4 TYPE 2 DIABETES MELLITUS WITH HYPERGLYCEMIA, WITH LONG-TERM CURRENT USE OF INSULIN (HCC): Primary | ICD-10-CM

## 2023-08-25 DIAGNOSIS — Z79.4 TYPE 2 DIABETES MELLITUS WITH RETINOPATHY WITHOUT MACULAR EDEMA, WITH LONG-TERM CURRENT USE OF INSULIN, UNSPECIFIED LATERALITY, UNSPECIFIED RETINOPATHY SEVERITY (HCC): ICD-10-CM

## 2023-08-25 DIAGNOSIS — E78.2 MIXED HYPERLIPIDEMIA: ICD-10-CM

## 2023-08-25 DIAGNOSIS — E11.65 TYPE 2 DIABETES MELLITUS WITH HYPERGLYCEMIA, WITH LONG-TERM CURRENT USE OF INSULIN (HCC): Primary | ICD-10-CM

## 2023-08-25 PROCEDURE — 1090F PRES/ABSN URINE INCON ASSESS: CPT | Performed by: INTERNAL MEDICINE

## 2023-08-25 PROCEDURE — 3078F DIAST BP <80 MM HG: CPT | Performed by: INTERNAL MEDICINE

## 2023-08-25 PROCEDURE — G8417 CALC BMI ABV UP PARAM F/U: HCPCS | Performed by: INTERNAL MEDICINE

## 2023-08-25 PROCEDURE — 2022F DILAT RTA XM EVC RTNOPTHY: CPT | Performed by: INTERNAL MEDICINE

## 2023-08-25 PROCEDURE — 99214 OFFICE O/P EST MOD 30 MIN: CPT | Performed by: INTERNAL MEDICINE

## 2023-08-25 PROCEDURE — 1123F ACP DISCUSS/DSCN MKR DOCD: CPT | Performed by: INTERNAL MEDICINE

## 2023-08-25 PROCEDURE — 3046F HEMOGLOBIN A1C LEVEL >9.0%: CPT | Performed by: INTERNAL MEDICINE

## 2023-08-25 PROCEDURE — 3074F SYST BP LT 130 MM HG: CPT | Performed by: INTERNAL MEDICINE

## 2023-08-25 PROCEDURE — 3017F COLORECTAL CA SCREEN DOC REV: CPT | Performed by: INTERNAL MEDICINE

## 2023-08-25 PROCEDURE — 1036F TOBACCO NON-USER: CPT | Performed by: INTERNAL MEDICINE

## 2023-08-25 PROCEDURE — G8400 PT W/DXA NO RESULTS DOC: HCPCS | Performed by: INTERNAL MEDICINE

## 2023-08-25 PROCEDURE — G8428 CUR MEDS NOT DOCUMENT: HCPCS | Performed by: INTERNAL MEDICINE

## 2023-08-25 RX ORDER — INSULIN DEGLUDEC INJECTION 100 U/ML
56 INJECTION, SOLUTION SUBCUTANEOUS DAILY
COMMUNITY
Start: 2023-07-24

## 2023-08-25 RX ORDER — TIRZEPATIDE 10 MG/.5ML
INJECTION, SOLUTION SUBCUTANEOUS
Qty: 2 ML | Refills: 1 | Status: SHIPPED | OUTPATIENT
Start: 2023-08-25

## 2023-08-25 RX ORDER — FLUTICASONE PROPIONATE AND SALMETEROL 50; 250 UG/1; UG/1
POWDER RESPIRATORY (INHALATION)
COMMUNITY
Start: 2023-08-02

## 2023-08-25 RX ORDER — FLURBIPROFEN SODIUM 0.3 MG/ML
SOLUTION/ DROPS OPHTHALMIC
COMMUNITY
Start: 2023-08-07

## 2023-08-25 NOTE — PATIENT INSTRUCTIONS
Increase moujaro to 7.5mg once a week    Lower toujeo max to 40 units once a day - continue to lower if needed to keep AM sugars > 80

## 2023-08-25 NOTE — PROGRESS NOTES
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    HTN: on ACE-I, on diuretics, HTN followed by PCP     LIPIDS: not at goal, on ezetimibe, lipids followed by Cardiology- PCSK9 considered but costly and concern about side effects  (no statins b/c bad aches - tried several)    RENAL: has normal renal function, has normal KOLBY-r , in the past year, is on an ACE-I     EYES: eye exam in past year, has retinopathy     DENTAL:  has seen dentist in past year     FEET: sees podiatry, has no current issues     HEART:  no chest pain, shortness of breath or claudication, has no cardiac history     ASA:  is on aspirin     SYMPTOMS: no polyuria, thirst or blurred vision     THYROID: no known thyroid issue    DIABETES HISTORY:    Diagnosed early 2000s   On insulin most of that time   Pills were not working   Had been on metformin - GI Issues (was not ER)   No DPP4, no SGT, no GLP   Did try actos - not clear why stopped   lantus in past - was too expensive   jardiance stopped b/c cost issues -pt was also worried about the class in general   tradjenta did not help sugars   Stopped CAUSEY b/c lows and increasing trulicity      LABS/STUDIES:   12/31/14 Carotids: intimal thickening throughout -no significant stenosis; vertebral artery with antegrade flow bilaterally      PCP labs 5/8/20 BMP wnl, Chol 231/101/56/154, a1c 6.5   PCP labs 2/16/21 CMP ok, a1c 6.5, Chol

## 2023-09-28 RX ORDER — INSULIN DEGLUDEC INJECTION 100 U/ML
INJECTION, SOLUTION SUBCUTANEOUS
Qty: 21 ML | Refills: 5 | Status: SHIPPED | OUTPATIENT
Start: 2023-09-28

## 2023-11-03 DIAGNOSIS — Z79.4 TYPE 2 DIABETES MELLITUS WITH HYPERGLYCEMIA, WITH LONG-TERM CURRENT USE OF INSULIN (HCC): ICD-10-CM

## 2023-11-03 DIAGNOSIS — E11.65 TYPE 2 DIABETES MELLITUS WITH HYPERGLYCEMIA, WITH LONG-TERM CURRENT USE OF INSULIN (HCC): ICD-10-CM

## 2023-11-03 RX ORDER — BLOOD SUGAR DIAGNOSTIC
STRIP MISCELLANEOUS
Qty: 100 EACH | Refills: 0 | Status: SHIPPED | OUTPATIENT
Start: 2023-11-03

## 2023-11-03 RX ORDER — TIRZEPATIDE 10 MG/.5ML
INJECTION, SOLUTION SUBCUTANEOUS
Qty: 4 ML | Refills: 0 | Status: SHIPPED | OUTPATIENT
Start: 2023-11-03

## 2023-11-20 RX ORDER — FLURBIPROFEN SODIUM 0.3 MG/ML
SOLUTION/ DROPS OPHTHALMIC
Qty: 100 EACH | Refills: 3 | Status: SHIPPED | OUTPATIENT
Start: 2023-11-20

## 2023-11-25 DIAGNOSIS — Z79.4 TYPE 2 DIABETES MELLITUS WITH HYPERGLYCEMIA, WITH LONG-TERM CURRENT USE OF INSULIN (HCC): ICD-10-CM

## 2023-11-25 DIAGNOSIS — E11.65 TYPE 2 DIABETES MELLITUS WITH HYPERGLYCEMIA, WITH LONG-TERM CURRENT USE OF INSULIN (HCC): ICD-10-CM

## 2023-11-30 RX ORDER — FLURBIPROFEN SODIUM 0.3 MG/ML
SOLUTION/ DROPS OPHTHALMIC
Qty: 100 EACH | Refills: 3 | Status: SHIPPED | OUTPATIENT
Start: 2023-11-30

## 2023-12-01 RX ORDER — INSULIN DEGLUDEC INJECTION 100 U/ML
INJECTION, SOLUTION SUBCUTANEOUS
Qty: 21 ML | Refills: 5 | Status: SHIPPED | OUTPATIENT
Start: 2023-12-01

## 2023-12-08 DIAGNOSIS — Z79.4 TYPE 2 DIABETES MELLITUS WITH HYPERGLYCEMIA, WITH LONG-TERM CURRENT USE OF INSULIN (HCC): ICD-10-CM

## 2023-12-08 DIAGNOSIS — E11.65 TYPE 2 DIABETES MELLITUS WITH HYPERGLYCEMIA, WITH LONG-TERM CURRENT USE OF INSULIN (HCC): ICD-10-CM

## 2023-12-08 RX ORDER — TIRZEPATIDE 10 MG/.5ML
INJECTION, SOLUTION SUBCUTANEOUS
Qty: 4 ML | Refills: 0 | Status: SHIPPED | OUTPATIENT
Start: 2023-12-08

## 2024-01-10 ENCOUNTER — TELEPHONE (OUTPATIENT)
Age: 69
End: 2024-01-10

## 2024-01-10 NOTE — TELEPHONE ENCOUNTER
I received a phone call from Ms. De Guzman saying she needs her lab order sent to [facility] and not [facility]  Gabbi

## 2024-01-11 DIAGNOSIS — E11.65 TYPE 2 DIABETES MELLITUS WITH HYPERGLYCEMIA, WITH LONG-TERM CURRENT USE OF INSULIN (HCC): ICD-10-CM

## 2024-01-11 DIAGNOSIS — Z79.4 TYPE 2 DIABETES MELLITUS WITH HYPERGLYCEMIA, WITH LONG-TERM CURRENT USE OF INSULIN (HCC): ICD-10-CM

## 2024-01-11 RX ORDER — TIRZEPATIDE 10 MG/.5ML
INJECTION, SOLUTION SUBCUTANEOUS
Qty: 4 ML | Refills: 0 | Status: SHIPPED | OUTPATIENT
Start: 2024-01-11

## 2024-01-11 NOTE — TELEPHONE ENCOUNTER
I called Ms. De Guzman and relayed the message from Dr. Doan. She said to just order them and then send them in the mail to her and she will take the order in with her when she gets them drawn.  Gabbi

## 2024-01-22 DIAGNOSIS — E11.65 TYPE 2 DIABETES MELLITUS WITH HYPERGLYCEMIA, WITH LONG-TERM CURRENT USE OF INSULIN (HCC): Primary | ICD-10-CM

## 2024-01-22 DIAGNOSIS — Z79.4 TYPE 2 DIABETES MELLITUS WITH HYPERGLYCEMIA, WITH LONG-TERM CURRENT USE OF INSULIN (HCC): Primary | ICD-10-CM

## 2024-01-22 NOTE — TELEPHONE ENCOUNTER
I returned this call and let Ms. De Guzman know that the lab order had been sent out last week. I asked her if she had a fax number to fax it to and she gave me a number to the [facility]. It is [phone]. The lab order has been faxed to that number today.  Gabbi

## 2024-01-22 NOTE — TELEPHONE ENCOUNTER
1/22/2024  10:49 AM    Pt called and stated she has not received the lab ordered that was mailed to her. She has an appointment with [facility] tomorrow.     Pt can be reached at [phone].    Thank you,   David Enriquez

## 2024-01-24 ENCOUNTER — PATIENT MESSAGE (OUTPATIENT)
Age: 69
End: 2024-01-24

## 2024-01-24 LAB — HBA1C MFR BLD HPLC: 5.5 %

## 2024-01-29 ENCOUNTER — OFFICE VISIT (OUTPATIENT)
Age: 69
End: 2024-01-29
Payer: COMMERCIAL

## 2024-01-29 VITALS
WEIGHT: 217 LBS | BODY MASS INDEX: 38.45 KG/M2 | SYSTOLIC BLOOD PRESSURE: 144 MMHG | DIASTOLIC BLOOD PRESSURE: 68 MMHG | HEART RATE: 83 BPM | HEIGHT: 63 IN

## 2024-01-29 DIAGNOSIS — Z79.4 TYPE 2 DIABETES MELLITUS WITH HYPERGLYCEMIA, WITH LONG-TERM CURRENT USE OF INSULIN (HCC): Primary | ICD-10-CM

## 2024-01-29 DIAGNOSIS — E11.65 TYPE 2 DIABETES MELLITUS WITH HYPERGLYCEMIA, WITH LONG-TERM CURRENT USE OF INSULIN (HCC): Primary | ICD-10-CM

## 2024-01-29 DIAGNOSIS — E78.2 MIXED HYPERLIPIDEMIA: ICD-10-CM

## 2024-01-29 DIAGNOSIS — I10 PRIMARY HYPERTENSION: ICD-10-CM

## 2024-01-29 DIAGNOSIS — E11.319 TYPE 2 DIABETES MELLITUS WITH RETINOPATHY WITHOUT MACULAR EDEMA, WITH LONG-TERM CURRENT USE OF INSULIN, UNSPECIFIED LATERALITY, UNSPECIFIED RETINOPATHY SEVERITY (HCC): ICD-10-CM

## 2024-01-29 DIAGNOSIS — Z79.4 TYPE 2 DIABETES MELLITUS WITH RETINOPATHY WITHOUT MACULAR EDEMA, WITH LONG-TERM CURRENT USE OF INSULIN, UNSPECIFIED LATERALITY, UNSPECIFIED RETINOPATHY SEVERITY (HCC): ICD-10-CM

## 2024-01-29 PROCEDURE — 1090F PRES/ABSN URINE INCON ASSESS: CPT | Performed by: INTERNAL MEDICINE

## 2024-01-29 PROCEDURE — G8400 PT W/DXA NO RESULTS DOC: HCPCS | Performed by: INTERNAL MEDICINE

## 2024-01-29 PROCEDURE — 2022F DILAT RTA XM EVC RTNOPTHY: CPT | Performed by: INTERNAL MEDICINE

## 2024-01-29 PROCEDURE — G8484 FLU IMMUNIZE NO ADMIN: HCPCS | Performed by: INTERNAL MEDICINE

## 2024-01-29 PROCEDURE — 1036F TOBACCO NON-USER: CPT | Performed by: INTERNAL MEDICINE

## 2024-01-29 PROCEDURE — 95251 CONT GLUC MNTR ANALYSIS I&R: CPT | Performed by: INTERNAL MEDICINE

## 2024-01-29 PROCEDURE — G8417 CALC BMI ABV UP PARAM F/U: HCPCS | Performed by: INTERNAL MEDICINE

## 2024-01-29 PROCEDURE — 3017F COLORECTAL CA SCREEN DOC REV: CPT | Performed by: INTERNAL MEDICINE

## 2024-01-29 PROCEDURE — 3077F SYST BP >= 140 MM HG: CPT | Performed by: INTERNAL MEDICINE

## 2024-01-29 PROCEDURE — G8428 CUR MEDS NOT DOCUMENT: HCPCS | Performed by: INTERNAL MEDICINE

## 2024-01-29 PROCEDURE — 99214 OFFICE O/P EST MOD 30 MIN: CPT | Performed by: INTERNAL MEDICINE

## 2024-01-29 PROCEDURE — 3046F HEMOGLOBIN A1C LEVEL >9.0%: CPT | Performed by: INTERNAL MEDICINE

## 2024-01-29 PROCEDURE — 1123F ACP DISCUSS/DSCN MKR DOCD: CPT | Performed by: INTERNAL MEDICINE

## 2024-01-29 PROCEDURE — 3078F DIAST BP <80 MM HG: CPT | Performed by: INTERNAL MEDICINE

## 2024-01-29 NOTE — PROGRESS NOTES
PCP labs 2/16/21 CMP ok, a1c 6.5, Chol 114/226/62/141   PCP labs 6/3/21 chol 225/108/67/136, GFR > 60, TSH 6.3, TSH 1.5   PCP labs 1/13/22 Chol 212/55/99/136, a1c 6.1, GFR > 60, ALT 14, AST 15   PCP labs 8/9/22 a1c 6.0, Chol 208/101/65/123, GFR > 60   PCP labs 3/10/23 200/67/84/115, Cr 0.7, TSH 0.9  8/17/23 chol 206/100/63/122, gluc 62, GFR> 60, KOLBY-r 7, a1c 5.6   1/23/24 a1c 5.5, GFR > 60    No results found for: \"ZJR1LXRK\"    No results found for: \"HBA1C\", \"LABA1C\"      Lab Results   Component Value Date/Time    PXHK4CXWZ 6.1 01/14/2022 12:00 AM    CREATININE 0.65 07/23/2020 12:06 PM       No results found for: \"CHOL\", \"TRIG\", \"HDL\", \"LDLCALC\"    No results found for: \"TSH\"    No results found for: \"CPEPLT\"    Past Medical History:   Diagnosis Date    Acid indigestion     Asthma     Back pain     Constipation     Depression     Diabetes (HCC)     Hypertension     Insomnia     Multiple stiff joints     Muscle ache     Nausea     Shortness of breath     Weakness     Wheezing           Blood pressure (!) 144/68, pulse 83, height 1.6 m (5' 3\"), weight 98.4 kg (217 lb).     well tolerated         EXAM  - not done today       Assessment/Plan:   1. Type 2 diabetes mellitus with hyperglycemia, with long-term current use of insulin (HCC)  Have been lowering insulin as mounjaro increased  PCP just lowered more b/c low gluc on BMP and gave sample CGM  I had told patient to check if CGM on plan last visit but never did it (medicare Part A only; commercial ins for DME/Pharm)  CGM still with lows so lower to 40 units and continue to lower 5 units at a time to prevent lows  Can increase mounjaro but wait until no lows AND worried Rx issue as been getting forms saying A1c too good and won't cover it (despite A1c being good b/c is on the medication)     2. Mixed hyperlipidemia  Not at goal on zetia; can't tolerated statins  Cards considering PCSK9 but patient declined due to cost and concern for side effects     3. Type 2 diabetes

## 2024-01-29 NOTE — PATIENT INSTRUCTIONS
Lower Lantus to 40 units  If still having low sugars go down another 5 units at a time    Continue mounjaro 10mg for now

## 2024-01-31 RX ORDER — BLOOD SUGAR DIAGNOSTIC
STRIP MISCELLANEOUS
Qty: 100 EACH | Refills: 0 | Status: SHIPPED | OUTPATIENT
Start: 2024-01-31

## 2024-02-05 ENCOUNTER — TELEPHONE (OUTPATIENT)
Age: 69
End: 2024-02-05

## 2024-02-05 DIAGNOSIS — E11.65 TYPE 2 DIABETES MELLITUS WITH HYPERGLYCEMIA, WITH LONG-TERM CURRENT USE OF INSULIN (HCC): Primary | ICD-10-CM

## 2024-02-05 DIAGNOSIS — Z79.4 TYPE 2 DIABETES MELLITUS WITH HYPERGLYCEMIA, WITH LONG-TERM CURRENT USE OF INSULIN (HCC): Primary | ICD-10-CM

## 2024-02-05 RX ORDER — ACYCLOVIR 400 MG/1
TABLET ORAL
Qty: 9 EACH | Refills: 1 | Status: SHIPPED | OUTPATIENT
Start: 2024-02-05

## 2024-02-05 RX ORDER — ACYCLOVIR 400 MG/1
TABLET ORAL
Qty: 1 EACH | Refills: 0 | Status: SHIPPED | OUTPATIENT
Start: 2024-02-05

## 2024-02-05 NOTE — TELEPHONE ENCOUNTER
2/5/2024  10:02 AM      Patient called and stated her blood sugar reading is 54 acoording to her free style kristin.This reading is from about 10 minutes ago.    Patient#[phone]    Thanks,  Rosy Young

## 2024-02-05 NOTE — TELEPHONE ENCOUNTER
I returned this call and Ms. De Guzman said her CGM said her blood sugar was 54 but her finger stick said it was 68. I told her to go ahead and eat something preferably something with protein in it. She said she was going to go ahead and do that. I also told her to try to eat something small every 2 hours instead of just the 3 big meals to try to keep the sugar level even instead of highs and then lows. She said she had lowered her Tresbia to 35 units. After speaking with Dr. Doan, I told her to go ahead and lower it to 25 units with the option to lower it 5 more units if her blood sugars are still low. She said she would do this. She also said that her insurance will not pay for the FreeStyle Kosta as they prefer the Dexcom instead. She asked if we would order that and do a PA for it.  Gabbi

## 2024-02-12 DIAGNOSIS — E11.65 TYPE 2 DIABETES MELLITUS WITH HYPERGLYCEMIA, WITH LONG-TERM CURRENT USE OF INSULIN (HCC): ICD-10-CM

## 2024-02-12 DIAGNOSIS — Z79.4 TYPE 2 DIABETES MELLITUS WITH HYPERGLYCEMIA, WITH LONG-TERM CURRENT USE OF INSULIN (HCC): ICD-10-CM

## 2024-02-12 RX ORDER — TIRZEPATIDE 10 MG/.5ML
INJECTION, SOLUTION SUBCUTANEOUS
Qty: 4 ML | Refills: 0 | Status: SHIPPED | OUTPATIENT
Start: 2024-02-12

## 2024-03-19 RX ORDER — BLOOD SUGAR DIAGNOSTIC
STRIP MISCELLANEOUS
Qty: 100 EACH | Refills: 0 | Status: SHIPPED | OUTPATIENT
Start: 2024-03-19 | End: 2024-03-24

## 2024-03-23 DIAGNOSIS — Z79.4 TYPE 2 DIABETES MELLITUS WITH HYPERGLYCEMIA, WITH LONG-TERM CURRENT USE OF INSULIN (HCC): ICD-10-CM

## 2024-03-23 DIAGNOSIS — E11.65 TYPE 2 DIABETES MELLITUS WITH HYPERGLYCEMIA, WITH LONG-TERM CURRENT USE OF INSULIN (HCC): ICD-10-CM

## 2024-03-24 RX ORDER — BLOOD SUGAR DIAGNOSTIC
STRIP MISCELLANEOUS
Qty: 100 EACH | Refills: 0 | Status: SHIPPED | OUTPATIENT
Start: 2024-03-24

## 2024-03-24 RX ORDER — TIRZEPATIDE 10 MG/.5ML
INJECTION, SOLUTION SUBCUTANEOUS
Qty: 4 ML | Refills: 0 | Status: SHIPPED | OUTPATIENT
Start: 2024-03-24

## 2024-04-01 RX ORDER — TIRZEPATIDE 5 MG/.5ML
INJECTION, SOLUTION SUBCUTANEOUS
Qty: 2 ML | Refills: 0 | Status: SHIPPED | OUTPATIENT
Start: 2024-04-01

## 2024-04-01 RX ORDER — TIRZEPATIDE 5 MG/.5ML
INJECTION, SOLUTION SUBCUTANEOUS
Qty: 4 ML | Refills: 0 | Status: SHIPPED | OUTPATIENT
Start: 2024-04-01 | End: 2024-04-01 | Stop reason: SDUPTHER

## 2024-04-01 NOTE — TELEPHONE ENCOUNTER
Pt called and stated that [facility] no longer has Mounjaro available but the [facility] [address] does. [facility]

## 2024-04-01 NOTE — TELEPHONE ENCOUNTER
4/1/2024  9:48 AM    Patient called and stated she have been out of her Mounjaro medication for 2 weeks.Patient stated she is leaving tomorrow morning at 6:30 and will not be back home for a week.[facility] [address].    Pt#[phone]    Thanks,  Rosy Young

## 2024-04-10 ENCOUNTER — OFFICE VISIT (OUTPATIENT)
Age: 69
End: 2024-04-10
Payer: COMMERCIAL

## 2024-04-10 VITALS
HEART RATE: 80 BPM | BODY MASS INDEX: 38.45 KG/M2 | RESPIRATION RATE: 18 BRPM | DIASTOLIC BLOOD PRESSURE: 78 MMHG | WEIGHT: 217 LBS | HEIGHT: 63 IN | SYSTOLIC BLOOD PRESSURE: 150 MMHG | OXYGEN SATURATION: 96 % | TEMPERATURE: 98.7 F

## 2024-04-10 DIAGNOSIS — D17.9 INTRAMUSCULAR LIPOMA: Primary | ICD-10-CM

## 2024-04-10 PROCEDURE — 1090F PRES/ABSN URINE INCON ASSESS: CPT | Performed by: SURGERY

## 2024-04-10 PROCEDURE — G8427 DOCREV CUR MEDS BY ELIG CLIN: HCPCS | Performed by: SURGERY

## 2024-04-10 PROCEDURE — 3078F DIAST BP <80 MM HG: CPT | Performed by: SURGERY

## 2024-04-10 PROCEDURE — G8417 CALC BMI ABV UP PARAM F/U: HCPCS | Performed by: SURGERY

## 2024-04-10 PROCEDURE — 1123F ACP DISCUSS/DSCN MKR DOCD: CPT | Performed by: SURGERY

## 2024-04-10 PROCEDURE — G8400 PT W/DXA NO RESULTS DOC: HCPCS | Performed by: SURGERY

## 2024-04-10 PROCEDURE — 99203 OFFICE O/P NEW LOW 30 MIN: CPT | Performed by: SURGERY

## 2024-04-10 PROCEDURE — 3017F COLORECTAL CA SCREEN DOC REV: CPT | Performed by: SURGERY

## 2024-04-10 PROCEDURE — 1036F TOBACCO NON-USER: CPT | Performed by: SURGERY

## 2024-04-10 PROCEDURE — 3077F SYST BP >= 140 MM HG: CPT | Performed by: SURGERY

## 2024-04-10 RX ORDER — VITAMIN E 268 MG
400 CAPSULE ORAL DAILY
COMMUNITY

## 2024-04-10 RX ORDER — FLUTICASONE PROPIONATE AND SALMETEROL 250; 50 UG/1; UG/1
POWDER RESPIRATORY (INHALATION)
COMMUNITY

## 2024-04-10 ASSESSMENT — PATIENT HEALTH QUESTIONNAIRE - PHQ9
2. FEELING DOWN, DEPRESSED OR HOPELESS: SEVERAL DAYS
SUM OF ALL RESPONSES TO PHQ QUESTIONS 1-9: 1
SUM OF ALL RESPONSES TO PHQ QUESTIONS 1-9: 1
SUM OF ALL RESPONSES TO PHQ9 QUESTIONS 1 & 2: 1
SUM OF ALL RESPONSES TO PHQ QUESTIONS 1-9: 1
1. LITTLE INTEREST OR PLEASURE IN DOING THINGS: NOT AT ALL
SUM OF ALL RESPONSES TO PHQ QUESTIONS 1-9: 1

## 2024-04-10 NOTE — PROGRESS NOTES
Surgery History and Physical    Subjective:      Thais De Guzman  is a 69 y.o.   female who presents with a painful mass in her left upper arm.  First noticed it several weeks ago.  Plain films were unrewarding.  Dr. Sheldon ordered a ct, which demonstrates an intramuscular lipoma in the trapezius muscle.    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    Liver disease     Multiple stiff joints     Muscle ache     Nausea     Obesity     Osteoarthritis     Shortness of breath     Type 2 diabetes mellitus without complication (HCC)     Weakness     Wheezing        Past Surgical History:   Procedure Laterality Date    CHOLECYSTECTOMY      GI      benign tumor on stomach muscle     GYN      Hysterectomy     HYSTERECTOMY, VAGINAL      JOINT REPLACEMENT      Right knee    ORTHOPEDIC SURGERY      right knee replacement     ORTHOPEDIC SURGERY      bilateral carpal tunnel repair        Social History     Tobacco Use    Smoking status: Never    Smokeless tobacco: Never   Substance Use Topics    Alcohol use: Not Currently       Family History   Problem Relation Age of Onset    Stroke Mother     Diabetes Mother     Arthritis Mother             High Blood Pressure Mother     Rheum Arthritis Mother     Cancer Father             Hypertension Father     Stroke Father     High Blood Pressure Father     Prostate Cancer Father     Stroke Sister     Heart Disease Sister     Diabetes Sister     High Blood Pressure Sister     Hypertension Brother        Current Outpatient Medications on File Prior to Visit   Medication Sig Dispense Refill    WIXELA INHUB 250-50 MCG/ACT AEPB diskus inhaler INHALE 1 PUFF BY MOUTH TWICE A DAY FOR ASTHMA      vitamin E 400 UNIT capsule Take 1

## 2024-04-10 NOTE — PROGRESS NOTES
Identified patient with two patient identifiers (name and ). Reviewed chart in preparation for visit and have obtained necessary documentation.    Thais De Guzman is a 69 y.o. female  Chief Complaint   Patient presents with    New Patient     Mass on left arm      BP (!) 150/78 (Site: Right Upper Arm, Position: Sitting, Cuff Size: Large Adult)   Pulse 80   Temp 98.7 °F (37.1 °C) (Oral)   Resp 18   Ht 1.6 m (5' 3\")   Wt 98.4 kg (217 lb)   SpO2 96%   BMI 38.44 kg/m²     1. Have you been to the ER, urgent care clinic since your last visit?  Hospitalized since your last visit?Yes [facility], [facility], and [place]     2. Have you seen or consulted any other health care providers outside of the [facility] since your last visit?  Include any pap smears or colon screening. Yes PCP

## 2024-04-10 NOTE — H&P (VIEW-ONLY)
regular rate and rhythm, S1, S2 normal, no murmur, click, rub or gallop, ABDOMEN: soft, non-tender; bowel sounds normal; no masses,  no organomegaly, EXTREMITIES: large soft tissue mass in the posterior left upper arm, most likely in the trapezius, NEUROLOGIC: negative    Labs: No results found for this or any previous visit (from the past 24 hour(s)).    Data Review:  Radiology review: radiology report of CT shows a lipoma in the left trapezius muscle    Assessment and Plan:      Diagnosis Orders   1. Intramuscular lipoma, left trapezius            Large symptomatic lipoma of the left trapezius muscle.  She wants it out, which fits with my recommendation.Outpatient excision under anesthesia is a good idea.Will schedule      Signed By: Antoni Dumont MD     04/10/24

## 2024-04-11 ENCOUNTER — PREP FOR PROCEDURE (OUTPATIENT)
Age: 69
End: 2024-04-11

## 2024-04-11 ENCOUNTER — TELEPHONE (OUTPATIENT)
Age: 69
End: 2024-04-11

## 2024-04-11 NOTE — TELEPHONE ENCOUNTER
Contacted patient to schedule surgery with Dr. Dumont. Offered patient 4/19, 4/23, 4/30 and 5/3. Patient accepted 4/23. I told patient that I would send a surgical letter to her home address that we have on file [facility] Patient thanked me for the call.

## 2024-04-12 ENCOUNTER — TELEPHONE (OUTPATIENT)
Age: 69
End: 2024-04-12

## 2024-04-12 RX ORDER — ACETAMINOPHEN 325 MG/1
1000 TABLET ORAL ONCE
Status: CANCELLED | OUTPATIENT
Start: 2024-04-12 | End: 2024-04-12

## 2024-04-12 RX ORDER — SODIUM CHLORIDE 0.9 % (FLUSH) 0.9 %
5-40 SYRINGE (ML) INJECTION PRN
Status: CANCELLED | OUTPATIENT
Start: 2024-04-12

## 2024-04-12 RX ORDER — SODIUM CHLORIDE 9 MG/ML
INJECTION, SOLUTION INTRAVENOUS PRN
Status: CANCELLED | OUTPATIENT
Start: 2024-04-12

## 2024-04-12 RX ORDER — SODIUM CHLORIDE 0.9 % (FLUSH) 0.9 %
5-40 SYRINGE (ML) INJECTION EVERY 12 HOURS SCHEDULED
Status: CANCELLED | OUTPATIENT
Start: 2024-04-12

## 2024-04-12 NOTE — TELEPHONE ENCOUNTER
Patient left voicemail on 4/11/24 @ 1:35 stating that she's having problems with her sugar readings. They were reading low and now they are reading high.     Patient can be reached @ [phone].    Thanks,  Tricia

## 2024-04-15 ENCOUNTER — HOSPITAL ENCOUNTER (OUTPATIENT)
Facility: HOSPITAL | Age: 69
Discharge: HOME | End: 2024-04-18
Payer: COMMERCIAL

## 2024-04-15 VITALS
OXYGEN SATURATION: 97 % | SYSTOLIC BLOOD PRESSURE: 149 MMHG | BODY MASS INDEX: 39.76 KG/M2 | DIASTOLIC BLOOD PRESSURE: 76 MMHG | TEMPERATURE: 99 F | HEART RATE: 77 BPM | HEIGHT: 62 IN | WEIGHT: 216.05 LBS | RESPIRATION RATE: 18 BRPM

## 2024-04-15 LAB
ALBUMIN SERPL-MCNC: 3.3 G/DL (ref 3.5–5)
ALBUMIN/GLOB SERPL: 1 (ref 1.1–2.2)
ALP SERPL-CCNC: 62 U/L (ref 45–117)
ALT SERPL-CCNC: 26 U/L (ref 12–78)
ANION GAP SERPL CALC-SCNC: 3 MMOL/L (ref 5–15)
APTT PPP: 29.1 SEC (ref 22.1–31)
AST SERPL-CCNC: 13 U/L (ref 15–37)
BASOPHILS # BLD: 0 K/UL (ref 0–0.1)
BASOPHILS NFR BLD: 0 % (ref 0–1)
BILIRUB SERPL-MCNC: 0.2 MG/DL (ref 0.2–1)
BUN SERPL-MCNC: 15 MG/DL (ref 6–20)
BUN/CREAT SERPL: 21 (ref 12–20)
CALCIUM SERPL-MCNC: 9.6 MG/DL (ref 8.5–10.1)
CHLORIDE SERPL-SCNC: 109 MMOL/L (ref 97–108)
CO2 SERPL-SCNC: 28 MMOL/L (ref 21–32)
CREAT SERPL-MCNC: 0.73 MG/DL (ref 0.55–1.02)
DIFFERENTIAL METHOD BLD: NORMAL
EOSINOPHIL # BLD: 0.3 K/UL (ref 0–0.4)
EOSINOPHIL NFR BLD: 3 % (ref 0–7)
ERYTHROCYTE [DISTWIDTH] IN BLOOD BY AUTOMATED COUNT: 14.2 % (ref 11.5–14.5)
GLOBULIN SER CALC-MCNC: 3.2 G/DL (ref 2–4)
GLUCOSE SERPL-MCNC: 140 MG/DL (ref 65–100)
HCT VFR BLD AUTO: 38 % (ref 35–47)
HGB BLD-MCNC: 12.7 G/DL (ref 11.5–16)
IMM GRANULOCYTES # BLD AUTO: 0 K/UL (ref 0–0.04)
IMM GRANULOCYTES NFR BLD AUTO: 0 % (ref 0–0.5)
INR PPP: 1 (ref 0.9–1.1)
LYMPHOCYTES # BLD: 2.3 K/UL (ref 0.8–3.5)
LYMPHOCYTES NFR BLD: 27 % (ref 12–49)
MCH RBC QN AUTO: 29.8 PG (ref 26–34)
MCHC RBC AUTO-ENTMCNC: 33.4 G/DL (ref 30–36.5)
MCV RBC AUTO: 89.2 FL (ref 80–99)
MONOCYTES # BLD: 0.6 K/UL (ref 0–1)
MONOCYTES NFR BLD: 7 % (ref 5–13)
NEUTS SEG # BLD: 5.4 K/UL (ref 1.8–8)
NEUTS SEG NFR BLD: 63 % (ref 32–75)
NRBC # BLD: 0 K/UL (ref 0–0.01)
NRBC BLD-RTO: 0 PER 100 WBC
PLATELET # BLD AUTO: 239 K/UL (ref 150–400)
PMV BLD AUTO: 11.5 FL (ref 8.9–12.9)
POTASSIUM SERPL-SCNC: 3.7 MMOL/L (ref 3.5–5.1)
PROT SERPL-MCNC: 6.5 G/DL (ref 6.4–8.2)
PROTHROMBIN TIME: 10.8 SEC (ref 9–11.1)
RBC # BLD AUTO: 4.26 M/UL (ref 3.8–5.2)
SODIUM SERPL-SCNC: 140 MMOL/L (ref 136–145)
THERAPEUTIC RANGE: NORMAL SECS (ref 58–77)
WBC # BLD AUTO: 8.6 K/UL (ref 3.6–11)

## 2024-04-15 PROCEDURE — 80053 COMPREHEN METABOLIC PANEL: CPT

## 2024-04-15 PROCEDURE — 85610 PROTHROMBIN TIME: CPT

## 2024-04-15 PROCEDURE — 83036 HEMOGLOBIN GLYCOSYLATED A1C: CPT

## 2024-04-15 PROCEDURE — 85730 THROMBOPLASTIN TIME PARTIAL: CPT

## 2024-04-15 PROCEDURE — 36415 COLL VENOUS BLD VENIPUNCTURE: CPT

## 2024-04-15 PROCEDURE — 85025 COMPLETE CBC W/AUTO DIFF WBC: CPT

## 2024-04-15 RX ORDER — CHLORTHALIDONE 25 MG/1
25 TABLET ORAL DAILY
COMMUNITY

## 2024-04-15 RX ORDER — ASPIRIN 81 MG/1
81 TABLET, CHEWABLE ORAL DAILY
COMMUNITY

## 2024-04-15 ASSESSMENT — PAIN DESCRIPTION - FREQUENCY: FREQUENCY: CONTINUOUS

## 2024-04-15 ASSESSMENT — PAIN SCALES - GENERAL: PAINLEVEL_OUTOF10: 6

## 2024-04-15 ASSESSMENT — PAIN DESCRIPTION - DESCRIPTORS: DESCRIPTORS: SORE

## 2024-04-15 ASSESSMENT — PAIN DESCRIPTION - ORIENTATION: ORIENTATION: LEFT

## 2024-04-15 ASSESSMENT — PAIN DESCRIPTION - PAIN TYPE: TYPE: CHRONIC PAIN

## 2024-04-15 ASSESSMENT — PAIN DESCRIPTION - LOCATION: LOCATION: ARM

## 2024-04-15 NOTE — FLOWSHEET NOTE
PT STATES SHE WOULD PREFER TO GET A SLEEP STUDY LATER AFTER HER SURGERY. SHE STATES SHE WILL TALK TO HER PCP TO GET A REFERRAL.

## 2024-04-15 NOTE — PERIOP NOTE
sugar levels slow down wound healing and prevent you from healing completely.        Day of Procedure    Please park in the parking deck or any designated visitor parking lot.  Enter the facility through the Main Entrance of the hospital.  On the day of surgery, please provide the cell phone number of the person who will be waiting for you to the Patient Access representative at the time of registration.  Masks are highly recommended in the hospital, but not required.  Once your procedure and the immediate recovery period is completed, a nurse in the recovery area will contact your designated visitor to inform them of your room number or to otherwise review other pertinent information regarding your care.    Social distancing practices are strongly encouraged in waiting areas and the cafeteria.       The  PT AND   WERE contacted in person.   They verbalized understanding of all instructions AND DO NOT need reinforcement.

## 2024-04-16 LAB
EST. AVERAGE GLUCOSE BLD GHB EST-MCNC: 126 MG/DL
HBA1C MFR BLD: 6 % (ref 4–5.6)

## 2024-04-18 NOTE — PERIOP NOTE
PT CALLED ASKING IF CAN TAKE HER TRESIBA THE MORNING OF SURGERY AND IF SHE SHOULD TAKE HER MOUNJARO SUN. 4/21/24 WHICH IS 2 DAYS PRIOR TO SURGERY.  ADVISED PT NOT TO TAKE HER TRESIBA INSULIN THE MORNING OF SURGERY AND NOT TO TAKE HER MOUNJARO THIS COMING SUNDAY PRIOR TO SURGERY.  PT VERBALIZED UNDERSTANDING.

## 2024-04-23 ENCOUNTER — APPOINTMENT (OUTPATIENT)
Facility: HOSPITAL | Age: 69
DRG: 951 | End: 2024-04-23
Attending: SURGERY
Payer: COMMERCIAL

## 2024-04-23 ENCOUNTER — ANESTHESIA (OUTPATIENT)
Facility: HOSPITAL | Age: 69
DRG: 983 | End: 2024-04-23
Payer: MEDICARE

## 2024-04-23 ENCOUNTER — ANESTHESIA EVENT (OUTPATIENT)
Facility: HOSPITAL | Age: 69
DRG: 983 | End: 2024-04-23
Payer: MEDICARE

## 2024-04-23 ENCOUNTER — HOSPITAL ENCOUNTER (INPATIENT)
Facility: HOSPITAL | Age: 69
LOS: 4 days | Discharge: HOME | DRG: 951 | End: 2024-04-29
Attending: SURGERY | Admitting: SURGERY
Payer: COMMERCIAL

## 2024-04-23 DIAGNOSIS — G89.18 POST-OP PAIN: Primary | ICD-10-CM

## 2024-04-23 PROBLEM — D17.9 ANGIOLIPOMA: Status: ACTIVE | Noted: 2024-04-23

## 2024-04-23 PROBLEM — D17.20 LIPOMA OF EXTREMITY: Status: ACTIVE | Noted: 2024-04-23

## 2024-04-23 LAB
GLUCOSE BLD STRIP.AUTO-MCNC: 103 MG/DL (ref 65–117)
GLUCOSE BLD STRIP.AUTO-MCNC: 141 MG/DL (ref 65–117)
GLUCOSE BLD STRIP.AUTO-MCNC: 89 MG/DL (ref 65–117)
SERVICE CMNT-IMP: ABNORMAL
SERVICE CMNT-IMP: NORMAL
SERVICE CMNT-IMP: NORMAL

## 2024-04-23 PROCEDURE — 0KB60ZZ EXCISION OF LEFT SHOULDER MUSCLE, OPEN APPROACH: ICD-10-PCS | Performed by: SURGERY

## 2024-04-23 PROCEDURE — 2580000003 HC RX 258: Performed by: SURGERY

## 2024-04-23 PROCEDURE — 6360000002 HC RX W HCPCS: Performed by: SURGERY

## 2024-04-23 PROCEDURE — 3600000002 HC SURGERY LEVEL 2 BASE: Performed by: SURGERY

## 2024-04-23 PROCEDURE — 6360000002 HC RX W HCPCS: Performed by: NURSE ANESTHETIST, CERTIFIED REGISTERED

## 2024-04-23 PROCEDURE — 6370000000 HC RX 637 (ALT 250 FOR IP): Performed by: SURGERY

## 2024-04-23 PROCEDURE — 88304 TISSUE EXAM BY PATHOLOGIST: CPT

## 2024-04-23 PROCEDURE — 3600000012 HC SURGERY LEVEL 2 ADDTL 15MIN: Performed by: SURGERY

## 2024-04-23 PROCEDURE — 2580000003 HC RX 258: Performed by: ANESTHESIOLOGY

## 2024-04-23 PROCEDURE — G0378 HOSPITAL OBSERVATION PER HR: HCPCS

## 2024-04-23 PROCEDURE — 2500000003 HC RX 250 WO HCPCS: Performed by: NURSE ANESTHETIST, CERTIFIED REGISTERED

## 2024-04-23 PROCEDURE — 6370000000 HC RX 637 (ALT 250 FOR IP): Performed by: NURSE ANESTHETIST, CERTIFIED REGISTERED

## 2024-04-23 PROCEDURE — 7100000001 HC PACU RECOVERY - ADDTL 15 MIN: Performed by: SURGERY

## 2024-04-23 PROCEDURE — 2720000010 HC SURG SUPPLY STERILE: Performed by: SURGERY

## 2024-04-23 PROCEDURE — 82962 GLUCOSE BLOOD TEST: CPT

## 2024-04-23 PROCEDURE — 71045 X-RAY EXAM CHEST 1 VIEW: CPT

## 2024-04-23 PROCEDURE — 3700000001 HC ADD 15 MINUTES (ANESTHESIA): Performed by: SURGERY

## 2024-04-23 PROCEDURE — 2580000003 HC RX 258: Performed by: NURSE ANESTHETIST, CERTIFIED REGISTERED

## 2024-04-23 PROCEDURE — 3700000000 HC ANESTHESIA ATTENDED CARE: Performed by: SURGERY

## 2024-04-23 PROCEDURE — 7100000000 HC PACU RECOVERY - FIRST 15 MIN: Performed by: SURGERY

## 2024-04-23 PROCEDURE — 2709999900 HC NON-CHARGEABLE SUPPLY: Performed by: SURGERY

## 2024-04-23 PROCEDURE — 2500000003 HC RX 250 WO HCPCS: Performed by: SURGERY

## 2024-04-23 RX ORDER — OXYCODONE HYDROCHLORIDE AND ACETAMINOPHEN 5; 325 MG/1; MG/1
2 TABLET ORAL EVERY 4 HOURS PRN
Status: DISCONTINUED | OUTPATIENT
Start: 2024-04-23 | End: 2024-04-29 | Stop reason: HOSPADM

## 2024-04-23 RX ORDER — MAGNESIUM SULFATE IN WATER 40 MG/ML
2000 INJECTION, SOLUTION INTRAVENOUS PRN
Status: DISCONTINUED | OUTPATIENT
Start: 2024-04-23 | End: 2024-04-29 | Stop reason: HOSPADM

## 2024-04-23 RX ORDER — BUPIVACAINE HYDROCHLORIDE AND EPINEPHRINE 5; 5 MG/ML; UG/ML
INJECTION, SOLUTION EPIDURAL; INTRACAUDAL; PERINEURAL PRN
Status: DISCONTINUED | OUTPATIENT
Start: 2024-04-23 | End: 2024-04-23 | Stop reason: HOSPADM

## 2024-04-23 RX ORDER — SODIUM CHLORIDE 0.9 % (FLUSH) 0.9 %
5-40 SYRINGE (ML) INJECTION EVERY 12 HOURS SCHEDULED
Status: DISCONTINUED | OUTPATIENT
Start: 2024-04-23 | End: 2024-04-29 | Stop reason: HOSPADM

## 2024-04-23 RX ORDER — SODIUM CHLORIDE 0.9 % (FLUSH) 0.9 %
5-40 SYRINGE (ML) INJECTION EVERY 12 HOURS SCHEDULED
Status: DISCONTINUED | OUTPATIENT
Start: 2024-04-23 | End: 2024-04-23 | Stop reason: HOSPADM

## 2024-04-23 RX ORDER — POTASSIUM CHLORIDE 750 MG/1
40 TABLET, FILM COATED, EXTENDED RELEASE ORAL PRN
Status: DISCONTINUED | OUTPATIENT
Start: 2024-04-23 | End: 2024-04-29 | Stop reason: HOSPADM

## 2024-04-23 RX ORDER — ASPIRIN 81 MG/1
81 TABLET, CHEWABLE ORAL DAILY
Status: DISCONTINUED | OUTPATIENT
Start: 2024-04-23 | End: 2024-04-29 | Stop reason: HOSPADM

## 2024-04-23 RX ORDER — LIDOCAINE HYDROCHLORIDE 20 MG/ML
INJECTION, SOLUTION EPIDURAL; INFILTRATION; INTRACAUDAL; PERINEURAL PRN
Status: DISCONTINUED | OUTPATIENT
Start: 2024-04-23 | End: 2024-04-23 | Stop reason: SDUPTHER

## 2024-04-23 RX ORDER — OXYCODONE HYDROCHLORIDE AND ACETAMINOPHEN 5; 325 MG/1; MG/1
1 TABLET ORAL EVERY 4 HOURS PRN
Status: DISCONTINUED | OUTPATIENT
Start: 2024-04-23 | End: 2024-04-29 | Stop reason: HOSPADM

## 2024-04-23 RX ORDER — OXYCODONE HYDROCHLORIDE AND ACETAMINOPHEN 5; 325 MG/1; MG/1
1 TABLET ORAL EVERY 6 HOURS PRN
Qty: 20 TABLET | Refills: 0 | Status: SHIPPED | OUTPATIENT
Start: 2024-04-23 | End: 2024-04-28

## 2024-04-23 RX ORDER — INSULIN LISPRO 100 [IU]/ML
0-4 INJECTION, SOLUTION INTRAVENOUS; SUBCUTANEOUS NIGHTLY
Status: DISCONTINUED | OUTPATIENT
Start: 2024-04-23 | End: 2024-04-29 | Stop reason: HOSPADM

## 2024-04-23 RX ORDER — ACETAMINOPHEN 325 MG/1
650 TABLET ORAL EVERY 6 HOURS PRN
Status: DISCONTINUED | OUTPATIENT
Start: 2024-04-23 | End: 2024-04-29 | Stop reason: HOSPADM

## 2024-04-23 RX ORDER — ONDANSETRON 4 MG/1
4 TABLET, ORALLY DISINTEGRATING ORAL EVERY 8 HOURS PRN
Status: DISCONTINUED | OUTPATIENT
Start: 2024-04-23 | End: 2024-04-29 | Stop reason: HOSPADM

## 2024-04-23 RX ORDER — FENTANYL CITRATE 50 UG/ML
INJECTION, SOLUTION INTRAMUSCULAR; INTRAVENOUS PRN
Status: DISCONTINUED | OUTPATIENT
Start: 2024-04-23 | End: 2024-04-23 | Stop reason: SDUPTHER

## 2024-04-23 RX ORDER — POLYETHYLENE GLYCOL 3350 17 G/17G
17 POWDER, FOR SOLUTION ORAL DAILY PRN
Status: DISCONTINUED | OUTPATIENT
Start: 2024-04-23 | End: 2024-04-29 | Stop reason: HOSPADM

## 2024-04-23 RX ORDER — SODIUM CHLORIDE, SODIUM LACTATE, POTASSIUM CHLORIDE, CALCIUM CHLORIDE 600; 310; 30; 20 MG/100ML; MG/100ML; MG/100ML; MG/100ML
INJECTION, SOLUTION INTRAVENOUS CONTINUOUS
Status: DISCONTINUED | OUTPATIENT
Start: 2024-04-23 | End: 2024-04-23 | Stop reason: HOSPADM

## 2024-04-23 RX ORDER — ENOXAPARIN SODIUM 100 MG/ML
40 INJECTION SUBCUTANEOUS DAILY
Status: DISCONTINUED | OUTPATIENT
Start: 2024-04-23 | End: 2024-04-29 | Stop reason: HOSPADM

## 2024-04-23 RX ORDER — SODIUM CHLORIDE 9 MG/ML
INJECTION, SOLUTION INTRAVENOUS PRN
Status: DISCONTINUED | OUTPATIENT
Start: 2024-04-23 | End: 2024-04-29 | Stop reason: HOSPADM

## 2024-04-23 RX ORDER — SODIUM CHLORIDE 0.9 % (FLUSH) 0.9 %
5-40 SYRINGE (ML) INJECTION EVERY 12 HOURS SCHEDULED
Status: CANCELLED | OUTPATIENT
Start: 2024-04-23

## 2024-04-23 RX ORDER — AMLODIPINE BESYLATE 5 MG/1
10 TABLET ORAL DAILY
Status: DISCONTINUED | OUTPATIENT
Start: 2024-04-24 | End: 2024-04-29 | Stop reason: HOSPADM

## 2024-04-23 RX ORDER — SODIUM CHLORIDE 9 MG/ML
INJECTION, SOLUTION INTRAVENOUS PRN
Status: CANCELLED | OUTPATIENT
Start: 2024-04-23

## 2024-04-23 RX ORDER — PHENYLEPHRINE HCL IN 0.9% NACL 0.4MG/10ML
SYRINGE (ML) INTRAVENOUS PRN
Status: DISCONTINUED | OUTPATIENT
Start: 2024-04-23 | End: 2024-04-23 | Stop reason: SDUPTHER

## 2024-04-23 RX ORDER — EZETIMIBE 10 MG/1
10 TABLET ORAL DAILY
Status: DISCONTINUED | OUTPATIENT
Start: 2024-04-23 | End: 2024-04-29 | Stop reason: HOSPADM

## 2024-04-23 RX ORDER — SODIUM CHLORIDE 0.9 % (FLUSH) 0.9 %
5-40 SYRINGE (ML) INJECTION PRN
Status: DISCONTINUED | OUTPATIENT
Start: 2024-04-23 | End: 2024-04-23 | Stop reason: HOSPADM

## 2024-04-23 RX ORDER — PROCHLORPERAZINE EDISYLATE 5 MG/ML
5 INJECTION INTRAMUSCULAR; INTRAVENOUS
Status: CANCELLED | OUTPATIENT
Start: 2024-04-23 | End: 2024-04-24

## 2024-04-23 RX ORDER — POTASSIUM CHLORIDE 7.45 MG/ML
10 INJECTION INTRAVENOUS PRN
Status: DISCONTINUED | OUTPATIENT
Start: 2024-04-23 | End: 2024-04-29 | Stop reason: HOSPADM

## 2024-04-23 RX ORDER — PANTOPRAZOLE SODIUM 40 MG/1
40 TABLET, DELAYED RELEASE ORAL
Status: DISCONTINUED | OUTPATIENT
Start: 2024-04-24 | End: 2024-04-29 | Stop reason: HOSPADM

## 2024-04-23 RX ORDER — SODIUM CHLORIDE 0.9 % (FLUSH) 0.9 %
5-40 SYRINGE (ML) INJECTION PRN
Status: CANCELLED | OUTPATIENT
Start: 2024-04-23

## 2024-04-23 RX ORDER — ACETAMINOPHEN 500 MG
1000 TABLET ORAL ONCE
Status: COMPLETED | OUTPATIENT
Start: 2024-04-23 | End: 2024-04-23

## 2024-04-23 RX ORDER — HYDROMORPHONE HYDROCHLORIDE 1 MG/ML
0.25 INJECTION, SOLUTION INTRAMUSCULAR; INTRAVENOUS; SUBCUTANEOUS EVERY 5 MIN PRN
Status: CANCELLED | OUTPATIENT
Start: 2024-04-23

## 2024-04-23 RX ORDER — PROPOFOL 10 MG/ML
INJECTION, EMULSION INTRAVENOUS PRN
Status: DISCONTINUED | OUTPATIENT
Start: 2024-04-23 | End: 2024-04-23 | Stop reason: SDUPTHER

## 2024-04-23 RX ORDER — DEXAMETHASONE SODIUM PHOSPHATE 4 MG/ML
INJECTION, SOLUTION INTRA-ARTICULAR; INTRALESIONAL; INTRAMUSCULAR; INTRAVENOUS; SOFT TISSUE PRN
Status: DISCONTINUED | OUTPATIENT
Start: 2024-04-23 | End: 2024-04-23 | Stop reason: SDUPTHER

## 2024-04-23 RX ORDER — SODIUM CHLORIDE 9 MG/ML
INJECTION, SOLUTION INTRAVENOUS PRN
Status: DISCONTINUED | OUTPATIENT
Start: 2024-04-23 | End: 2024-04-23 | Stop reason: HOSPADM

## 2024-04-23 RX ORDER — MIDAZOLAM HYDROCHLORIDE 1 MG/ML
INJECTION INTRAMUSCULAR; INTRAVENOUS PRN
Status: DISCONTINUED | OUTPATIENT
Start: 2024-04-23 | End: 2024-04-23 | Stop reason: SDUPTHER

## 2024-04-23 RX ORDER — MIDAZOLAM HYDROCHLORIDE 1 MG/ML
INJECTION INTRAMUSCULAR; INTRAVENOUS PRN
Status: DISCONTINUED | OUTPATIENT
Start: 2024-04-23 | End: 2024-04-23

## 2024-04-23 RX ORDER — SODIUM CHLORIDE, SODIUM LACTATE, POTASSIUM CHLORIDE, CALCIUM CHLORIDE 600; 310; 30; 20 MG/100ML; MG/100ML; MG/100ML; MG/100ML
INJECTION, SOLUTION INTRAVENOUS CONTINUOUS PRN
Status: DISCONTINUED | OUTPATIENT
Start: 2024-04-23 | End: 2024-04-23 | Stop reason: SDUPTHER

## 2024-04-23 RX ORDER — ACETAMINOPHEN 650 MG/1
650 SUPPOSITORY RECTAL EVERY 6 HOURS PRN
Status: DISCONTINUED | OUTPATIENT
Start: 2024-04-23 | End: 2024-04-29 | Stop reason: HOSPADM

## 2024-04-23 RX ORDER — ONDANSETRON 2 MG/ML
INJECTION INTRAMUSCULAR; INTRAVENOUS PRN
Status: DISCONTINUED | OUTPATIENT
Start: 2024-04-23 | End: 2024-04-23 | Stop reason: SDUPTHER

## 2024-04-23 RX ORDER — ONDANSETRON 2 MG/ML
4 INJECTION INTRAMUSCULAR; INTRAVENOUS EVERY 6 HOURS PRN
Status: DISCONTINUED | OUTPATIENT
Start: 2024-04-23 | End: 2024-04-29 | Stop reason: HOSPADM

## 2024-04-23 RX ORDER — SODIUM CHLORIDE 0.9 % (FLUSH) 0.9 %
5-40 SYRINGE (ML) INJECTION PRN
Status: DISCONTINUED | OUTPATIENT
Start: 2024-04-23 | End: 2024-04-29 | Stop reason: HOSPADM

## 2024-04-23 RX ORDER — KETOROLAC TROMETHAMINE 30 MG/ML
INJECTION, SOLUTION INTRAMUSCULAR; INTRAVENOUS PRN
Status: DISCONTINUED | OUTPATIENT
Start: 2024-04-23 | End: 2024-04-23 | Stop reason: SDUPTHER

## 2024-04-23 RX ORDER — CHLORTHALIDONE 25 MG/1
25 TABLET ORAL DAILY
Status: DISCONTINUED | OUTPATIENT
Start: 2024-04-23 | End: 2024-04-29 | Stop reason: HOSPADM

## 2024-04-23 RX ORDER — FENTANYL CITRATE 50 UG/ML
50 INJECTION, SOLUTION INTRAMUSCULAR; INTRAVENOUS EVERY 5 MIN PRN
Status: CANCELLED | OUTPATIENT
Start: 2024-04-23

## 2024-04-23 RX ORDER — SUCCINYLCHOLINE/SOD CL,ISO/PF 200MG/10ML
SYRINGE (ML) INTRAVENOUS PRN
Status: DISCONTINUED | OUTPATIENT
Start: 2024-04-23 | End: 2024-04-23 | Stop reason: SDUPTHER

## 2024-04-23 RX ORDER — NALOXONE HYDROCHLORIDE 0.4 MG/ML
INJECTION, SOLUTION INTRAMUSCULAR; INTRAVENOUS; SUBCUTANEOUS PRN
Status: CANCELLED | OUTPATIENT
Start: 2024-04-23

## 2024-04-23 RX ORDER — DEXTROSE MONOHYDRATE 100 MG/ML
INJECTION, SOLUTION INTRAVENOUS CONTINUOUS PRN
Status: DISCONTINUED | OUTPATIENT
Start: 2024-04-23 | End: 2024-04-29 | Stop reason: HOSPADM

## 2024-04-23 RX ORDER — ALBUTEROL SULFATE 90 UG/1
AEROSOL, METERED RESPIRATORY (INHALATION) PRN
Status: DISCONTINUED | OUTPATIENT
Start: 2024-04-23 | End: 2024-04-23 | Stop reason: SDUPTHER

## 2024-04-23 RX ADMIN — Medication 200 MG: at 16:39

## 2024-04-23 RX ADMIN — ACETAMINOPHEN 1000 MG: 500 TABLET ORAL at 14:46

## 2024-04-23 RX ADMIN — ALBUTEROL SULFATE 2 PUFF: 90 AEROSOL, METERED RESPIRATORY (INHALATION) at 17:23

## 2024-04-23 RX ADMIN — SODIUM CHLORIDE, POTASSIUM CHLORIDE, SODIUM LACTATE AND CALCIUM CHLORIDE: 600; 310; 30; 20 INJECTION, SOLUTION INTRAVENOUS at 14:59

## 2024-04-23 RX ADMIN — ONDANSETRON 4 MG: 2 INJECTION INTRAMUSCULAR; INTRAVENOUS at 17:06

## 2024-04-23 RX ADMIN — PROPOFOL 150 MG: 10 INJECTION, EMULSION INTRAVENOUS at 16:29

## 2024-04-23 RX ADMIN — Medication 80 MCG: at 17:11

## 2024-04-23 RX ADMIN — DEXAMETHASONE SODIUM PHOSPHATE 4 MG: 4 INJECTION, SOLUTION INTRA-ARTICULAR; INTRALESIONAL; INTRAMUSCULAR; INTRAVENOUS; SOFT TISSUE at 17:06

## 2024-04-23 RX ADMIN — KETOROLAC TROMETHAMINE 30 MG: 30 INJECTION, SOLUTION INTRAMUSCULAR; INTRAVENOUS at 17:46

## 2024-04-23 RX ADMIN — SODIUM CHLORIDE, SODIUM LACTATE, POTASSIUM CHLORIDE, CALCIUM CHLORIDE: 600; 310; 30; 20 INJECTION, SOLUTION INTRAVENOUS at 16:22

## 2024-04-23 RX ADMIN — WATER 2000 MG: 1 INJECTION INTRAMUSCULAR; INTRAVENOUS; SUBCUTANEOUS at 23:19

## 2024-04-23 RX ADMIN — ALBUTEROL SULFATE 2 PUFF: 90 AEROSOL, METERED RESPIRATORY (INHALATION) at 17:24

## 2024-04-23 RX ADMIN — CHLORTHALIDONE 25 MG: 25 TABLET ORAL at 21:16

## 2024-04-23 RX ADMIN — SODIUM CHLORIDE, PRESERVATIVE FREE 10 ML: 5 INJECTION INTRAVENOUS at 21:16

## 2024-04-23 RX ADMIN — EZETIMIBE 10 MG: 10 TABLET ORAL at 21:15

## 2024-04-23 RX ADMIN — ALBUTEROL SULFATE 2 PUFF: 90 AEROSOL, METERED RESPIRATORY (INHALATION) at 17:22

## 2024-04-23 RX ADMIN — ASPIRIN 81 MG CHEWABLE TABLET 81 MG: 81 TABLET CHEWABLE at 21:15

## 2024-04-23 RX ADMIN — Medication 80 MCG: at 17:00

## 2024-04-23 RX ADMIN — WATER 2000 MG: 1 INJECTION INTRAMUSCULAR; INTRAVENOUS; SUBCUTANEOUS at 16:35

## 2024-04-23 RX ADMIN — MIDAZOLAM HYDROCHLORIDE 2 MG: 1 INJECTION INTRAMUSCULAR; INTRAVENOUS at 16:22

## 2024-04-23 RX ADMIN — LIDOCAINE HYDROCHLORIDE 60 MG: 20 INJECTION, SOLUTION EPIDURAL; INFILTRATION; INTRACAUDAL; PERINEURAL at 16:29

## 2024-04-23 RX ADMIN — FENTANYL CITRATE 50 MCG: 50 INJECTION, SOLUTION INTRAMUSCULAR; INTRAVENOUS at 16:36

## 2024-04-23 RX ADMIN — OXYCODONE HYDROCHLORIDE AND ACETAMINOPHEN 2 TABLET: 5; 325 TABLET ORAL at 23:12

## 2024-04-23 ASSESSMENT — PAIN DESCRIPTION - FREQUENCY: FREQUENCY: CONTINUOUS

## 2024-04-23 ASSESSMENT — PAIN DESCRIPTION - ORIENTATION
ORIENTATION: LEFT
ORIENTATION: LEFT

## 2024-04-23 ASSESSMENT — PAIN SCALES - GENERAL
PAINLEVEL_OUTOF10: 1
PAINLEVEL_OUTOF10: 8

## 2024-04-23 ASSESSMENT — PAIN DESCRIPTION - PAIN TYPE
TYPE: CHRONIC PAIN
TYPE: SURGICAL PAIN

## 2024-04-23 ASSESSMENT — PAIN DESCRIPTION - DESCRIPTORS
DESCRIPTORS: ACHING;SORE
DESCRIPTORS: SORE

## 2024-04-23 ASSESSMENT — PAIN DESCRIPTION - LOCATION: LOCATION: SHOULDER

## 2024-04-23 NOTE — ANESTHESIA PRE PROCEDURE
Department of Anesthesiology  Preprocedure Note       Name:  Thais De Guzman   Age:  69 y.o.  :  1955                                          MRN:  [id number]         Date:  2024      Surgeon: Surgeon(s):  Antoni Dumont MD    Procedure: Procedure(s):  EXCISION OF INTRAMUSCULAR LIPOMA OF THE LEFT TRAPEZIUS MUSCLE    Medications prior to admission:   Prior to Admission medications    Medication Sig Start Date End Date Taking? Authorizing Provider   chlorthalidone (HYGROTON) 25 MG tablet Take 1 tablet by mouth daily    Kennedy Carrera MD   aspirin 81 MG chewable tablet Take 1 tablet by mouth daily    Kennedy Carrera MD WIXELA INHUB 250-50 MCG/ACT AEPB diskus inhaler Inhale 1 puff into the lungs daily    Kennedy Carrera MD   vitamin E 400 UNIT capsule Take 1 capsule by mouth daily    Kennedy Carrera MD   DICLOFENAC SODIUM PO Take 75 mg by mouth as needed    Kennedy Carrera MD   ACCU-CHEK GUIDE strip USE 1 NEW STRIP TO CHECK GLUCOSE TWICE DAILY 3/24/24   Michaela Doan MD   MOUNJARO 10 MG/0.5ML SOPN SC injection INJECT 1 SYRINGE SUBCUTANEOUSLY ONCE A WEEK 3/24/24   Michaela Doan MD   Continuous Blood Gluc Sensor (DEXCOM G7 SENSOR) MISC Use every 10 days 24   Michaela Doan MD   TRESIBA FLEXTOUCH 100 UNIT/ML SOPN INJECT 70 UNITS VIA SUBCUTANEOUS ROUTE ONCE DAILY  Patient taking differently: Inject 35 Units into the skin daily INJECT 45 UNITS VIA SUBCUTANEOUS ROUTE ONCE DAILY 23   Michaela Doan MD   Insulin Pen Needle (B-D UF III MINI PEN NEEDLES) 31G X 5 MM MISC Use to inject once daily 23   Michaela Doan MD   albuterol sulfate HFA (PROVENTIL;VENTOLIN;PROAIR) 108 (90 Base) MCG/ACT inhaler Inhale 2 puffs into the lungs every 6 hours as needed    Automatic Reconciliation, Ar   amLODIPine (NORVASC) 10 MG tablet Take 1 tablet by mouth daily    Automatic Reconciliation, Ar   ascorbic acid (VITAMIN C) 1000 MG tablet Take 1 tablet by

## 2024-04-23 NOTE — PERIOP NOTE
TRANSFER - OUT REPORT:    Verbal report given to DEANDRE Kong on Thais De Guzman  being transferred to [facility] for routine post-op       Report consisted of patient's Situation, Background, Assessment and   Recommendations(SBAR).     Information from the following report(s) Surgery Report and Recent Results was reviewed with the receiving nurse.           Lines:   Peripheral IV 04/23/24 Posterior;Right Hand (Active)   Site Assessment Clean, dry & intact 04/23/24 1747   Line Status Infusing 04/23/24 1747   Line Care Connections checked and tightened 04/23/24 1747   Phlebitis Assessment No symptoms 04/23/24 1747   Infiltration Assessment 0 04/23/24 1747   Alcohol Cap Used Yes 04/23/24 1747   Dressing Status Clean, dry & intact 04/23/24 1747   Dressing Type Transparent 04/23/24 1747        Opportunity for questions and clarification was provided.      Patient transported with:  O2 @ 3lpm and Tech    Pt family updated.

## 2024-04-23 NOTE — INTERVAL H&P NOTE
Update History & Physical    The patient's History and Physical of April 23, 2024 was reviewed with the patient and I examined the patient. There was no change. The surgical site was confirmed by the patient and me.     Plan: The risks, benefits, expected outcome, and alternative to the recommended procedure have been discussed with the patient. Patient understands and wants to proceed with the procedure.     Electronically signed by Antoni Dumont MD on 4/23/2024 at 2:05 PM

## 2024-04-23 NOTE — BRIEF OP NOTE
Brief Postoperative Note      Patient: Thais De Guzman  YOB: 1955  MRN: [id number]    Date of Procedure: 4/23/2024    Pre-Op Diagnosis Codes:     * Intramuscular lipoma [D17.9]    Post-Op Diagnosis: Same, Post-Op Diagnosis Codes:     * Intramuscular lipoma [D17.9],  of left deltoid muscle       Procedure(s):  EXCISION OF INTRAMUSCULAR LIPOMA OF THE LEFT deltoid MUSCLE    Surgeon(s):  Antoni Dumont MD    Assistant:  Surgical Assistant: Martha Medina    Anesthesia: General    Estimated Blood Loss (mL): Minimal    Complications: None    Specimens:   ID Type Source Tests Collected by Time Destination   1 : Intramuscular Lipoma left deltoid Tissue Cyst SURGICAL PATHOLOGY Antoni Dumont MD 4/23/2024 1708        Implants:  * No implants in log *      Drains: * No LDAs found *    Findings:  Infection Present At Time Of Surgery (PATOS) (choose all levels that have infection present):  No infection present  Other Findings: 12 x 8 cm intramuscular lipoma  This procedure was not performed to treat primary cutaneous melanoma through wide local excision    Electronically signed by Antoni Dumont MD on 4/23/2024 at 5:10 PM      [id number]

## 2024-04-23 NOTE — PERIOP NOTE
"Speech Language Therapy Evaluation completed to address cognition  Functional Status:  The patient was seen for cognitive-linguistic evaluation. The patient was awake, alert and reporting multiple post concussion symptoms including dizziness, nausea, headache, double vision. The patient was given a variety of cognitive-linguistic assessments. Tesing revealed mild-moderate deficits in the areas of attention, memory, auditory comprehension, and reading comprehension.  Anticipate as post concussion symptoms subside cognitive functioning will improve. Pain appeared to hinder participation at times this session.     Recommendations:  1) Continue cognitive-linguistic therapy during acute care to ensure resolution of deficits. Patient okay to discharge home with family with post acute follow up.     Plan of Care: Will benefit from Speech Therapy 3 times per week.    Post-Acute Therapy: Discharge to home with outpatient or home health for additional skilled therapy services.    See \"Rehab Therapy-Acute\" Patient Summary Report for complete documentation.   " SURGICEL SNOW WAS GIVEN TO THE STERILE FIELD TO BE USED BY MD DURING PROCEDURE  REF: [id number]  LOT: [id number]  EXP: 12-

## 2024-04-23 NOTE — DISCHARGE INSTRUCTIONS
Patient Discharge Instructions    Thais BANUELOS Gege / [id number] : 1955    Admitted 2024 Discharged: 2024     Take Home Medications            It is important that you take the medication exactly as they are prescribed.   Keep your medication in the bottles provided by the pharmacist and keep a list of the medication names, dosages, and times to be taken in your wallet.   Do not take other medications without consulting your doctor.       What to do at Home    Recommended diet: regular diet,     Recommended activity: activity as tolerated, may shower whenever you wish          [unfilled]        Information obtained by :  I understand that if any problems occur once I am at home I am to contact my physician.    I understand and acknowledge receipt of the instructions indicated above.                                                                                                                                           Physician's or R.N.'s Signature                                                                  Date/Time                                                                                                                                              Patient or Representative Signature                                                          Date/Time

## 2024-04-24 LAB
GLUCOSE BLD STRIP.AUTO-MCNC: 145 MG/DL (ref 65–117)
GLUCOSE BLD STRIP.AUTO-MCNC: 159 MG/DL (ref 65–117)
GLUCOSE BLD STRIP.AUTO-MCNC: 163 MG/DL (ref 65–117)
GLUCOSE BLD STRIP.AUTO-MCNC: 188 MG/DL (ref 65–117)
GLUCOSE BLD STRIP.AUTO-MCNC: 257 MG/DL (ref 65–117)
SERVICE CMNT-IMP: ABNORMAL

## 2024-04-24 PROCEDURE — 94640 AIRWAY INHALATION TREATMENT: CPT

## 2024-04-24 PROCEDURE — 2580000003 HC RX 258: Performed by: SURGERY

## 2024-04-24 PROCEDURE — 6360000002 HC RX W HCPCS: Performed by: PHYSICIAN ASSISTANT

## 2024-04-24 PROCEDURE — 6370000000 HC RX 637 (ALT 250 FOR IP): Performed by: SURGERY

## 2024-04-24 PROCEDURE — G0378 HOSPITAL OBSERVATION PER HR: HCPCS

## 2024-04-24 PROCEDURE — 23073 EXC SHOULDER TUM DEEP 5 CM/>: CPT | Performed by: SURGERY

## 2024-04-24 PROCEDURE — 96372 THER/PROPH/DIAG INJ SC/IM: CPT

## 2024-04-24 PROCEDURE — 6360000002 HC RX W HCPCS: Performed by: SURGERY

## 2024-04-24 PROCEDURE — 82962 GLUCOSE BLOOD TEST: CPT

## 2024-04-24 RX ORDER — BUDESONIDE 0.5 MG/2ML
0.5 INHALANT ORAL
Status: DISCONTINUED | OUTPATIENT
Start: 2024-04-24 | End: 2024-04-29 | Stop reason: HOSPADM

## 2024-04-24 RX ADMIN — ENOXAPARIN SODIUM 40 MG: 100 INJECTION SUBCUTANEOUS at 09:04

## 2024-04-24 RX ADMIN — OXYCODONE HYDROCHLORIDE AND ACETAMINOPHEN 1 TABLET: 5; 325 TABLET ORAL at 20:22

## 2024-04-24 RX ADMIN — SODIUM CHLORIDE, PRESERVATIVE FREE 10 ML: 5 INJECTION INTRAVENOUS at 10:04

## 2024-04-24 RX ADMIN — CHLORTHALIDONE 25 MG: 25 TABLET ORAL at 09:04

## 2024-04-24 RX ADMIN — EZETIMIBE 10 MG: 10 TABLET ORAL at 09:04

## 2024-04-24 RX ADMIN — SODIUM CHLORIDE, PRESERVATIVE FREE 10 ML: 5 INJECTION INTRAVENOUS at 20:26

## 2024-04-24 RX ADMIN — PANTOPRAZOLE SODIUM 40 MG: 40 TABLET, DELAYED RELEASE ORAL at 07:43

## 2024-04-24 RX ADMIN — AMLODIPINE BESYLATE 10 MG: 5 TABLET ORAL at 09:01

## 2024-04-24 RX ADMIN — ASPIRIN 81 MG CHEWABLE TABLET 81 MG: 81 TABLET CHEWABLE at 09:04

## 2024-04-24 RX ADMIN — BUDESONIDE 500 MCG: 0.5 INHALANT RESPIRATORY (INHALATION) at 22:09

## 2024-04-24 ASSESSMENT — PAIN DESCRIPTION - PAIN TYPE: TYPE: SURGICAL PAIN

## 2024-04-24 ASSESSMENT — PAIN SCALES - GENERAL: PAINLEVEL_OUTOF10: 7

## 2024-04-24 ASSESSMENT — PAIN DESCRIPTION - ORIENTATION: ORIENTATION: LEFT

## 2024-04-24 ASSESSMENT — PAIN DESCRIPTION - LOCATION: LOCATION: SHOULDER

## 2024-04-24 ASSESSMENT — PAIN DESCRIPTION - DESCRIPTORS: DESCRIPTORS: ACHING

## 2024-04-24 NOTE — ANESTHESIA POSTPROCEDURE EVALUATION
Department of Anesthesiology  Postprocedure Note    Patient: Thais De Guzman  MRN: [id number]  YOB: 1955  Date of evaluation: 4/24/2024    Procedure Summary       Date: 04/23/24 Room / Location: [facility] MAIN OR [address] [place] [address] MAIN OR    Anesthesia Start: 1622 Anesthesia Stop: 1753    Procedure: EXCISION OF INTRAMUSCULAR LIPOMA OF THE LEFT TRAPEZIUS MUSCLE (Left) Diagnosis:       Intramuscular lipoma      (Intramuscular lipoma [D17.9])    Providers: Antoni Dumont MD Responsible Provider: Chris Geller Jr., MD    Anesthesia Type: General ASA Status: 3            Anesthesia Type: General    Brigitte Phase I: Brigitte Score: 9    Brigitte Phase II:      Anesthesia Post Evaluation    Patient location during evaluation: PACU  Patient participation: complete - patient participated  Level of consciousness: awake  Pain score: 2  Airway patency: patent  Nausea & Vomiting: no nausea  Cardiovascular status: blood pressure returned to baseline and hemodynamically stable  Respiratory status: acceptable  Hydration status: stable  Multimodal analgesia pain management approach    No notable events documented.

## 2024-04-24 NOTE — OP NOTE
[address]  [address]                            OPERATIVE REPORT      PATIENT NAME: CRISELDA HARRIS              : 1955  MED REC NO: [id number]                       ROOM: [facility]  ACCOUNT NO: [id number]                       ADMIT DATE: 2024  PROVIDER: Antoni Dumont MD    DATE OF SERVICE:  2024    PREOPERATIVE DIAGNOSES:  Intramuscular lipoma.    POSTOPERATIVE DIAGNOSES:  Intramuscular lipoma of the left deltoid muscle.    PROCEDURES PERFORMED:  Excision of intramuscular lipoma of the left deltoid muscle.    SURGEON:  Antoni Dumont MD    ASSISTANT:  Martha Medina SA    ANESTHESIA:  General.    ESTIMATED BLOOD LOSS:  Minimal.    SPECIMENS REMOVED:  Lipoma.    INTRAOPERATIVE FINDINGS:  No infection was present at the time of surgery.  This was not done for primary cutaneous melanoma and the lesion itself was about 12 x 8 cm in size.     COMPLICATIONS:  None.    IMPLANTS:  None.    INDICATIONS:  intramuscular lipoma.    DESCRIPTION OF PROCEDURE:  With the patient supine and suitably anesthetized, she was turned up a little bit to the left side up with a bump underneath her ribcage and the arm was secured.  The upper arm was then prepared with ChloraPrep and draped as a field.  0.5% Marcaine with epinephrine was infiltrated in the skin in a direction paralleling the direction of the humerus.  A 6 cm incision was made and the muscle fibers were approached and then opened in the direction of the fibers and a large lipoma was then dissected free bluntly.  Several of the places required cautery.  The lesion was removed in total.  There was a small little bleeder that was coagulated.  A small piece of Surgicel SNoW was placed into the muscle to help with continued hemostasis.  The fascia of the muscle was then reapproximated with 3-0 Vicryl suture.  Subcutaneous tissue was approximated with Vicryl.  Skin was closed with

## 2024-04-24 NOTE — CONSULTS
Pulmonary, Critical Care, and Sleep Medicine~Consult Note    Name: Thais De Guzman MRN: [id number]   : 1955 Hospital: [facility]   Date: 2024 10:21 AM Admission: 2024     Impression Plan   Abnormal chest x-ray.  Suspect more aspiration pneumonitis than aspiration pneumonia; afebrile, no leukocytosis, purulent sputum  Status post left trapezius intramuscular lipoma resection  Asthma, not bronchospastic  Suspected FAITH, not confirmed  Discussed with RN and attending  Wean O2 with saturations above 89% to room air  Encouraged IS  DVT/PUD proph   Will add bronchodilators to aid in recovery   Will hold abx at current time and continue to monitor   Follow up chest x-ray in 4-6 wks   Will need outpatient sleep study      Daily Progression:    Consult Note requested by hospitalist service     Patient presented for admission secondary to excision of a left intramuscular lipoma of the left trapezius muscle.  Underwent procedure 2024.  Unclear if the aspiration event happened during surgery or PACU but apparently it was a witnessed aspiration event.  She was n.p.o. prior to surgical intervention.  She has had several surgeries in the past without any incident.  Initially she was coughing up reddish colored sputum, but as of this morning it is normalizing.  There was only a small spot of dark red.  No discolored sputum mostly white and clear.  Postoperatively did not mount a white count, does not show any signs of febrile illness.  She has not diaphoretic or short of breath.  Denies any chronic cough.  No wheeze.  No chest pains reported.   is at bedside chest image taken following procedure showed a left clear lung but a right lower lobe airspace disease.    She has had a history of asthma and is on Wiexla.  Followed by PCP.  Normally well-controlled.  Last hospitalization for any kind of pulmonary associated disease processes was in  for COVID pneumonitis.  Patient goes on to

## 2024-04-25 ENCOUNTER — APPOINTMENT (OUTPATIENT)
Facility: HOSPITAL | Age: 69
DRG: 951 | End: 2024-04-25
Attending: SURGERY
Payer: COMMERCIAL

## 2024-04-25 PROBLEM — J95.4: Status: ACTIVE | Noted: 2024-04-25

## 2024-04-25 LAB
GLUCOSE BLD STRIP.AUTO-MCNC: 156 MG/DL (ref 65–117)
GLUCOSE BLD STRIP.AUTO-MCNC: 166 MG/DL (ref 65–117)
GLUCOSE BLD STRIP.AUTO-MCNC: 193 MG/DL (ref 65–117)
GLUCOSE BLD STRIP.AUTO-MCNC: 225 MG/DL (ref 65–117)
SERVICE CMNT-IMP: ABNORMAL

## 2024-04-25 PROCEDURE — G0378 HOSPITAL OBSERVATION PER HR: HCPCS

## 2024-04-25 PROCEDURE — 6360000002 HC RX W HCPCS: Performed by: PHYSICIAN ASSISTANT

## 2024-04-25 PROCEDURE — 6370000000 HC RX 637 (ALT 250 FOR IP): Performed by: SURGERY

## 2024-04-25 PROCEDURE — 71045 X-RAY EXAM CHEST 1 VIEW: CPT

## 2024-04-25 PROCEDURE — 87205 SMEAR GRAM STAIN: CPT

## 2024-04-25 PROCEDURE — 94640 AIRWAY INHALATION TREATMENT: CPT

## 2024-04-25 PROCEDURE — 1100000000 HC RM PRIVATE

## 2024-04-25 PROCEDURE — 6360000002 HC RX W HCPCS: Performed by: SURGERY

## 2024-04-25 PROCEDURE — 87070 CULTURE OTHR SPECIMN AEROBIC: CPT

## 2024-04-25 PROCEDURE — 82962 GLUCOSE BLOOD TEST: CPT

## 2024-04-25 PROCEDURE — 96372 THER/PROPH/DIAG INJ SC/IM: CPT

## 2024-04-25 PROCEDURE — 6370000000 HC RX 637 (ALT 250 FOR IP): Performed by: PHYSICIAN ASSISTANT

## 2024-04-25 RX ORDER — ALBUTEROL SULFATE 2.5 MG/3ML
2.5 SOLUTION RESPIRATORY (INHALATION)
Status: DISCONTINUED | OUTPATIENT
Start: 2024-04-25 | End: 2024-04-27

## 2024-04-25 RX ORDER — AMOXICILLIN AND CLAVULANATE POTASSIUM 875; 125 MG/1; MG/1
1 TABLET, FILM COATED ORAL EVERY 12 HOURS SCHEDULED
Status: DISCONTINUED | OUTPATIENT
Start: 2024-04-25 | End: 2024-04-29 | Stop reason: HOSPADM

## 2024-04-25 RX ADMIN — CHLORTHALIDONE 25 MG: 25 TABLET ORAL at 09:13

## 2024-04-25 RX ADMIN — PANTOPRAZOLE SODIUM 40 MG: 40 TABLET, DELAYED RELEASE ORAL at 07:19

## 2024-04-25 RX ADMIN — ASPIRIN 81 MG CHEWABLE TABLET 81 MG: 81 TABLET CHEWABLE at 09:13

## 2024-04-25 RX ADMIN — ENOXAPARIN SODIUM 40 MG: 100 INJECTION SUBCUTANEOUS at 09:09

## 2024-04-25 RX ADMIN — ALBUTEROL SULFATE 2.5 MG: 2.5 SOLUTION RESPIRATORY (INHALATION) at 11:45

## 2024-04-25 RX ADMIN — AMOXICILLIN AND CLAVULANATE POTASSIUM 1 TABLET: 875; 125 TABLET, FILM COATED ORAL at 21:24

## 2024-04-25 RX ADMIN — AMOXICILLIN AND CLAVULANATE POTASSIUM 1 TABLET: 875; 125 TABLET, FILM COATED ORAL at 11:41

## 2024-04-25 RX ADMIN — EZETIMIBE 10 MG: 10 TABLET ORAL at 09:13

## 2024-04-25 RX ADMIN — ALBUTEROL SULFATE 2.5 MG: 2.5 SOLUTION RESPIRATORY (INHALATION) at 17:04

## 2024-04-25 RX ADMIN — ALBUTEROL SULFATE 2.5 MG: 2.5 SOLUTION RESPIRATORY (INHALATION) at 22:16

## 2024-04-25 RX ADMIN — AMLODIPINE BESYLATE 10 MG: 5 TABLET ORAL at 09:13

## 2024-04-25 RX ADMIN — BUDESONIDE 500 MCG: 0.5 INHALANT RESPIRATORY (INHALATION) at 22:16

## 2024-04-25 RX ADMIN — OXYCODONE HYDROCHLORIDE AND ACETAMINOPHEN 1 TABLET: 5; 325 TABLET ORAL at 09:13

## 2024-04-25 ASSESSMENT — PAIN DESCRIPTION - ORIENTATION: ORIENTATION: LEFT

## 2024-04-25 ASSESSMENT — PAIN SCALES - GENERAL
PAINLEVEL_OUTOF10: 10
PAINLEVEL_OUTOF10: 0

## 2024-04-25 ASSESSMENT — PAIN DESCRIPTION - DESCRIPTORS: DESCRIPTORS: ACHING

## 2024-04-25 ASSESSMENT — PAIN DESCRIPTION - LOCATION: LOCATION: SHOULDER

## 2024-04-26 LAB
GLUCOSE BLD STRIP.AUTO-MCNC: 162 MG/DL (ref 65–117)
GLUCOSE BLD STRIP.AUTO-MCNC: 198 MG/DL (ref 65–117)
SERVICE CMNT-IMP: ABNORMAL
SERVICE CMNT-IMP: ABNORMAL

## 2024-04-26 PROCEDURE — 1100000000 HC RM PRIVATE

## 2024-04-26 PROCEDURE — 2700000000 HC OXYGEN THERAPY PER DAY

## 2024-04-26 PROCEDURE — 6360000002 HC RX W HCPCS: Performed by: SURGERY

## 2024-04-26 PROCEDURE — 6370000000 HC RX 637 (ALT 250 FOR IP): Performed by: PHYSICIAN ASSISTANT

## 2024-04-26 PROCEDURE — 82962 GLUCOSE BLOOD TEST: CPT

## 2024-04-26 PROCEDURE — 2580000003 HC RX 258: Performed by: SURGERY

## 2024-04-26 PROCEDURE — 6370000000 HC RX 637 (ALT 250 FOR IP): Performed by: SURGERY

## 2024-04-26 PROCEDURE — 99024 POSTOP FOLLOW-UP VISIT: CPT | Performed by: STUDENT IN AN ORGANIZED HEALTH CARE EDUCATION/TRAINING PROGRAM

## 2024-04-26 PROCEDURE — 94640 AIRWAY INHALATION TREATMENT: CPT

## 2024-04-26 PROCEDURE — 6360000002 HC RX W HCPCS: Performed by: PHYSICIAN ASSISTANT

## 2024-04-26 RX ORDER — CETIRIZINE HYDROCHLORIDE 10 MG/1
10 TABLET ORAL DAILY
Status: DISCONTINUED | OUTPATIENT
Start: 2024-04-26 | End: 2024-04-29 | Stop reason: HOSPADM

## 2024-04-26 RX ORDER — TIRZEPATIDE 5 MG/.5ML
INJECTION, SOLUTION SUBCUTANEOUS
OUTPATIENT
Start: 2024-04-26

## 2024-04-26 RX ORDER — LACTOBACILLUS RHAMNOSUS GG 10B CELL
1 CAPSULE ORAL
Status: DISCONTINUED | OUTPATIENT
Start: 2024-04-27 | End: 2024-04-29 | Stop reason: HOSPADM

## 2024-04-26 RX ADMIN — CETIRIZINE HYDROCHLORIDE 10 MG: 10 TABLET, FILM COATED ORAL at 11:15

## 2024-04-26 RX ADMIN — AMOXICILLIN AND CLAVULANATE POTASSIUM 1 TABLET: 875; 125 TABLET, FILM COATED ORAL at 09:04

## 2024-04-26 RX ADMIN — AMOXICILLIN AND CLAVULANATE POTASSIUM 1 TABLET: 875; 125 TABLET, FILM COATED ORAL at 20:18

## 2024-04-26 RX ADMIN — AMLODIPINE BESYLATE 10 MG: 5 TABLET ORAL at 09:04

## 2024-04-26 RX ADMIN — SODIUM CHLORIDE, PRESERVATIVE FREE 10 ML: 5 INJECTION INTRAVENOUS at 09:05

## 2024-04-26 RX ADMIN — ALBUTEROL SULFATE 2.5 MG: 2.5 SOLUTION RESPIRATORY (INHALATION) at 16:48

## 2024-04-26 RX ADMIN — ALBUTEROL SULFATE 2.5 MG: 2.5 SOLUTION RESPIRATORY (INHALATION) at 08:49

## 2024-04-26 RX ADMIN — CHLORTHALIDONE 25 MG: 25 TABLET ORAL at 09:04

## 2024-04-26 RX ADMIN — EZETIMIBE 10 MG: 10 TABLET ORAL at 09:04

## 2024-04-26 RX ADMIN — BUDESONIDE 500 MCG: 0.5 INHALANT RESPIRATORY (INHALATION) at 08:49

## 2024-04-26 RX ADMIN — ENOXAPARIN SODIUM 40 MG: 100 INJECTION SUBCUTANEOUS at 09:04

## 2024-04-26 RX ADMIN — ASPIRIN 81 MG CHEWABLE TABLET 81 MG: 81 TABLET CHEWABLE at 09:04

## 2024-04-26 RX ADMIN — PANTOPRAZOLE SODIUM 40 MG: 40 TABLET, DELAYED RELEASE ORAL at 07:03

## 2024-04-26 RX ADMIN — POLYETHYLENE GLYCOL 3350 17 G: 17 POWDER, FOR SOLUTION ORAL at 17:32

## 2024-04-26 RX ADMIN — ACETAMINOPHEN 650 MG: 325 TABLET ORAL at 20:18

## 2024-04-26 NOTE — PLAN OF CARE
Problem: ABCDS Injury Assessment  Goal: Absence of physical injury  Outcome: Progressing     Problem: Pain  Goal: Verbalizes/displays adequate comfort level or baseline comfort level  Outcome: Progressing     Problem: Safety - Adult  Goal: Free from fall injury  Outcome: Progressing     Problem: Discharge Planning  Goal: Discharge to home or other facility with appropriate resources  Outcome: Progressing     Problem: Chronic Conditions and Co-morbidities  Goal: Patient's chronic conditions and co-morbidity symptoms are monitored and maintained or improved  Outcome: Progressing

## 2024-04-27 LAB
BACTERIA SPEC CULT: NORMAL
GLUCOSE BLD STRIP.AUTO-MCNC: 153 MG/DL (ref 65–117)
GLUCOSE BLD STRIP.AUTO-MCNC: 175 MG/DL (ref 65–117)
GLUCOSE BLD STRIP.AUTO-MCNC: 221 MG/DL (ref 65–117)
GLUCOSE BLD STRIP.AUTO-MCNC: 263 MG/DL (ref 65–117)
GRAM STN SPEC: NORMAL
SERVICE CMNT-IMP: ABNORMAL
SERVICE CMNT-IMP: NORMAL

## 2024-04-27 PROCEDURE — 6360000002 HC RX W HCPCS: Performed by: SURGERY

## 2024-04-27 PROCEDURE — 6370000000 HC RX 637 (ALT 250 FOR IP): Performed by: PHYSICIAN ASSISTANT

## 2024-04-27 PROCEDURE — 6360000002 HC RX W HCPCS: Performed by: PHYSICIAN ASSISTANT

## 2024-04-27 PROCEDURE — 82962 GLUCOSE BLOOD TEST: CPT

## 2024-04-27 PROCEDURE — 2580000003 HC RX 258: Performed by: SURGERY

## 2024-04-27 PROCEDURE — 94640 AIRWAY INHALATION TREATMENT: CPT

## 2024-04-27 PROCEDURE — 6370000000 HC RX 637 (ALT 250 FOR IP): Performed by: SURGERY

## 2024-04-27 PROCEDURE — 1100000000 HC RM PRIVATE

## 2024-04-27 RX ORDER — ALBUTEROL SULFATE 2.5 MG/3ML
2.5 SOLUTION RESPIRATORY (INHALATION)
Status: DISCONTINUED | OUTPATIENT
Start: 2024-04-27 | End: 2024-04-29 | Stop reason: HOSPADM

## 2024-04-27 RX ADMIN — BUDESONIDE 500 MCG: 0.5 INHALANT RESPIRATORY (INHALATION) at 20:36

## 2024-04-27 RX ADMIN — Medication 1 CAPSULE: at 06:45

## 2024-04-27 RX ADMIN — ALBUTEROL SULFATE 2.5 MG: 2.5 SOLUTION RESPIRATORY (INHALATION) at 20:36

## 2024-04-27 RX ADMIN — AMLODIPINE BESYLATE 10 MG: 5 TABLET ORAL at 10:04

## 2024-04-27 RX ADMIN — CHLORTHALIDONE 25 MG: 25 TABLET ORAL at 10:04

## 2024-04-27 RX ADMIN — CETIRIZINE HYDROCHLORIDE 10 MG: 10 TABLET, FILM COATED ORAL at 10:04

## 2024-04-27 RX ADMIN — SODIUM CHLORIDE, PRESERVATIVE FREE 10 ML: 5 INJECTION INTRAVENOUS at 10:05

## 2024-04-27 RX ADMIN — PANTOPRAZOLE SODIUM 40 MG: 40 TABLET, DELAYED RELEASE ORAL at 06:44

## 2024-04-27 RX ADMIN — EZETIMIBE 10 MG: 10 TABLET ORAL at 10:04

## 2024-04-27 RX ADMIN — ENOXAPARIN SODIUM 40 MG: 100 INJECTION SUBCUTANEOUS at 10:04

## 2024-04-27 RX ADMIN — BUDESONIDE 500 MCG: 0.5 INHALANT RESPIRATORY (INHALATION) at 09:33

## 2024-04-27 RX ADMIN — AMOXICILLIN AND CLAVULANATE POTASSIUM 1 TABLET: 875; 125 TABLET, FILM COATED ORAL at 21:05

## 2024-04-27 RX ADMIN — AMOXICILLIN AND CLAVULANATE POTASSIUM 1 TABLET: 875; 125 TABLET, FILM COATED ORAL at 10:04

## 2024-04-27 RX ADMIN — ALBUTEROL SULFATE 2.5 MG: 2.5 SOLUTION RESPIRATORY (INHALATION) at 09:33

## 2024-04-27 RX ADMIN — SODIUM CHLORIDE, PRESERVATIVE FREE 10 ML: 5 INJECTION INTRAVENOUS at 21:05

## 2024-04-27 RX ADMIN — ASPIRIN 81 MG CHEWABLE TABLET 81 MG: 81 TABLET CHEWABLE at 10:04

## 2024-04-27 ASSESSMENT — PAIN SCALES - GENERAL
PAINLEVEL_OUTOF10: 0

## 2024-04-28 ENCOUNTER — APPOINTMENT (OUTPATIENT)
Facility: HOSPITAL | Age: 69
DRG: 951 | End: 2024-04-28
Attending: SURGERY
Payer: COMMERCIAL

## 2024-04-28 LAB
GLUCOSE BLD STRIP.AUTO-MCNC: 162 MG/DL (ref 65–117)
GLUCOSE BLD STRIP.AUTO-MCNC: 186 MG/DL (ref 65–117)
GLUCOSE BLD STRIP.AUTO-MCNC: 190 MG/DL (ref 65–117)
GLUCOSE BLD STRIP.AUTO-MCNC: 267 MG/DL (ref 65–117)
SERVICE CMNT-IMP: ABNORMAL

## 2024-04-28 PROCEDURE — 6360000002 HC RX W HCPCS: Performed by: SURGERY

## 2024-04-28 PROCEDURE — 6370000000 HC RX 637 (ALT 250 FOR IP): Performed by: PHYSICIAN ASSISTANT

## 2024-04-28 PROCEDURE — 1100000000 HC RM PRIVATE

## 2024-04-28 PROCEDURE — 6360000002 HC RX W HCPCS: Performed by: PHYSICIAN ASSISTANT

## 2024-04-28 PROCEDURE — 6370000000 HC RX 637 (ALT 250 FOR IP): Performed by: SURGERY

## 2024-04-28 PROCEDURE — 94760 N-INVAS EAR/PLS OXIMETRY 1: CPT

## 2024-04-28 PROCEDURE — 82962 GLUCOSE BLOOD TEST: CPT

## 2024-04-28 PROCEDURE — 2700000000 HC OXYGEN THERAPY PER DAY

## 2024-04-28 PROCEDURE — 94761 N-INVAS EAR/PLS OXIMETRY MLT: CPT

## 2024-04-28 PROCEDURE — 94640 AIRWAY INHALATION TREATMENT: CPT

## 2024-04-28 PROCEDURE — 71046 X-RAY EXAM CHEST 2 VIEWS: CPT

## 2024-04-28 RX ORDER — GUAIFENESIN 600 MG/1
600 TABLET, EXTENDED RELEASE ORAL 2 TIMES DAILY
Qty: 20 TABLET | Refills: 0 | Status: SHIPPED | OUTPATIENT
Start: 2024-04-28

## 2024-04-28 RX ORDER — GUAIFENESIN 600 MG/1
600 TABLET, EXTENDED RELEASE ORAL 2 TIMES DAILY
Status: DISCONTINUED | OUTPATIENT
Start: 2024-04-28 | End: 2024-04-29 | Stop reason: HOSPADM

## 2024-04-28 RX ADMIN — ACETAMINOPHEN 650 MG: 325 TABLET ORAL at 08:43

## 2024-04-28 RX ADMIN — AMOXICILLIN AND CLAVULANATE POTASSIUM 1 TABLET: 875; 125 TABLET, FILM COATED ORAL at 20:45

## 2024-04-28 RX ADMIN — BUDESONIDE 500 MCG: 0.5 INHALANT RESPIRATORY (INHALATION) at 07:08

## 2024-04-28 RX ADMIN — BUDESONIDE 500 MCG: 0.5 INHALANT RESPIRATORY (INHALATION) at 20:44

## 2024-04-28 RX ADMIN — EZETIMIBE 10 MG: 10 TABLET ORAL at 08:38

## 2024-04-28 RX ADMIN — ALBUTEROL SULFATE 2.5 MG: 2.5 SOLUTION RESPIRATORY (INHALATION) at 20:44

## 2024-04-28 RX ADMIN — CHLORTHALIDONE 25 MG: 25 TABLET ORAL at 08:38

## 2024-04-28 RX ADMIN — AMLODIPINE BESYLATE 10 MG: 5 TABLET ORAL at 08:38

## 2024-04-28 RX ADMIN — ENOXAPARIN SODIUM 40 MG: 100 INJECTION SUBCUTANEOUS at 08:37

## 2024-04-28 RX ADMIN — Medication 1 CAPSULE: at 06:43

## 2024-04-28 RX ADMIN — ALBUTEROL SULFATE 2.5 MG: 2.5 SOLUTION RESPIRATORY (INHALATION) at 07:08

## 2024-04-28 RX ADMIN — GUAIFENESIN 600 MG: 600 TABLET, EXTENDED RELEASE ORAL at 13:51

## 2024-04-28 RX ADMIN — AMOXICILLIN AND CLAVULANATE POTASSIUM 1 TABLET: 875; 125 TABLET, FILM COATED ORAL at 08:37

## 2024-04-28 RX ADMIN — ASPIRIN 81 MG CHEWABLE TABLET 81 MG: 81 TABLET CHEWABLE at 08:38

## 2024-04-28 RX ADMIN — CETIRIZINE HYDROCHLORIDE 10 MG: 10 TABLET, FILM COATED ORAL at 08:38

## 2024-04-28 RX ADMIN — GUAIFENESIN 600 MG: 600 TABLET, EXTENDED RELEASE ORAL at 20:45

## 2024-04-28 RX ADMIN — ACETAMINOPHEN 650 MG: 325 TABLET ORAL at 15:35

## 2024-04-28 RX ADMIN — PANTOPRAZOLE SODIUM 40 MG: 40 TABLET, DELAYED RELEASE ORAL at 06:43

## 2024-04-28 ASSESSMENT — PAIN SCALES - GENERAL
PAINLEVEL_OUTOF10: 5
PAINLEVEL_OUTOF10: 5

## 2024-04-28 ASSESSMENT — PAIN DESCRIPTION - ORIENTATION
ORIENTATION: LEFT
ORIENTATION: ANTERIOR

## 2024-04-28 ASSESSMENT — PAIN DESCRIPTION - LOCATION
LOCATION: CHEST
LOCATION: ARM

## 2024-04-28 ASSESSMENT — PAIN DESCRIPTION - DESCRIPTORS
DESCRIPTORS: ACHING
DESCRIPTORS: ACHING

## 2024-04-29 VITALS
BODY MASS INDEX: 38.92 KG/M2 | SYSTOLIC BLOOD PRESSURE: 127 MMHG | TEMPERATURE: 97.7 F | OXYGEN SATURATION: 99 % | HEART RATE: 73 BPM | WEIGHT: 212.8 LBS | DIASTOLIC BLOOD PRESSURE: 57 MMHG | RESPIRATION RATE: 18 BRPM

## 2024-04-29 LAB
GLUCOSE BLD STRIP.AUTO-MCNC: 193 MG/DL (ref 65–117)
SERVICE CMNT-IMP: ABNORMAL

## 2024-04-29 PROCEDURE — 6370000000 HC RX 637 (ALT 250 FOR IP): Performed by: SURGERY

## 2024-04-29 PROCEDURE — 6360000002 HC RX W HCPCS: Performed by: PHYSICIAN ASSISTANT

## 2024-04-29 PROCEDURE — 82962 GLUCOSE BLOOD TEST: CPT

## 2024-04-29 PROCEDURE — 6360000002 HC RX W HCPCS: Performed by: SURGERY

## 2024-04-29 PROCEDURE — 2580000003 HC RX 258: Performed by: SURGERY

## 2024-04-29 PROCEDURE — 99024 POSTOP FOLLOW-UP VISIT: CPT | Performed by: NURSE PRACTITIONER

## 2024-04-29 PROCEDURE — 6370000000 HC RX 637 (ALT 250 FOR IP): Performed by: PHYSICIAN ASSISTANT

## 2024-04-29 RX ADMIN — BUDESONIDE 500 MCG: 0.5 INHALANT RESPIRATORY (INHALATION) at 08:07

## 2024-04-29 RX ADMIN — Medication 1 CAPSULE: at 06:20

## 2024-04-29 RX ADMIN — ASPIRIN 81 MG CHEWABLE TABLET 81 MG: 81 TABLET CHEWABLE at 08:44

## 2024-04-29 RX ADMIN — ALBUTEROL SULFATE 2.5 MG: 2.5 SOLUTION RESPIRATORY (INHALATION) at 08:07

## 2024-04-29 RX ADMIN — AMOXICILLIN AND CLAVULANATE POTASSIUM 1 TABLET: 875; 125 TABLET, FILM COATED ORAL at 08:44

## 2024-04-29 RX ADMIN — CHLORTHALIDONE 25 MG: 25 TABLET ORAL at 08:47

## 2024-04-29 RX ADMIN — PANTOPRAZOLE SODIUM 40 MG: 40 TABLET, DELAYED RELEASE ORAL at 06:19

## 2024-04-29 RX ADMIN — AMLODIPINE BESYLATE 10 MG: 5 TABLET ORAL at 08:43

## 2024-04-29 RX ADMIN — SODIUM CHLORIDE, PRESERVATIVE FREE 10 ML: 5 INJECTION INTRAVENOUS at 08:49

## 2024-04-29 RX ADMIN — ENOXAPARIN SODIUM 40 MG: 100 INJECTION SUBCUTANEOUS at 08:44

## 2024-04-29 RX ADMIN — GUAIFENESIN 600 MG: 600 TABLET, EXTENDED RELEASE ORAL at 08:47

## 2024-04-29 RX ADMIN — EZETIMIBE 10 MG: 10 TABLET ORAL at 08:44

## 2024-04-29 RX ADMIN — CETIRIZINE HYDROCHLORIDE 10 MG: 10 TABLET, FILM COATED ORAL at 06:22

## 2024-04-29 ASSESSMENT — PAIN SCALES - GENERAL: PAINLEVEL_OUTOF10: 0

## 2024-04-29 NOTE — DISCHARGE SUMMARY
Patient ID:  Thais De Guzman  [id number]  69 y.o.  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 *       Admission Condition: Good    Discharge Condition: Good    Last Procedure: Procedure(s):  EXCISION OF INTRAMUSCULAR LIPOMA OF THE LEFT TRAPEZIUS MUSCLE      Hospital Course:   Pt was admitted d/t aspiration pneumonitis which occurred during the procedure.  Pulmonary was consulted and managed bronchodilators, added abx, ordered sputum ctx, added mucinex, pt improved over time w/ manaement - O2 sats were monitored closely.    Surgical site also monitored. Pt was discharged on POD 6    Consults: Pulmonary/Critical Care    Disposition: home    Patient Instructions:   Current Discharge Medication List        START taking these medications    Details   guaiFENesin (MUCINEX) 600 MG extended release tablet Take 1 tablet by mouth 2 times daily  Qty: 20 tablet, Refills: 0           CONTINUE these medications which have NOT CHANGED    Details   chlorthalidone (HYGROTON) 25 MG tablet Take 1 tablet by mouth daily      aspirin 81 MG chewable tablet Take 1 tablet by mouth daily      WIXELA INHUB 250-50 MCG/ACT AEPB diskus inhaler Inhale 1 puff into the lungs daily      vitamin E 400 UNIT capsule Take 1 capsule by mouth daily      DICLOFENAC SODIUM PO Take 75 mg by mouth as needed      ACCU-CHEK GUIDE strip USE 1 NEW STRIP TO CHECK GLUCOSE TWICE DAILY  Qty: 100 each, Refills: 0      MOUNJARO 10 MG/0.5ML SOPN SC injection INJECT 1 SYRINGE SUBCUTANEOUSLY ONCE A WEEK  Qty: 4 mL, Refills: 0    Associated

## 2024-04-29 NOTE — PROGRESS NOTES
[facility]    POD #2    Subjective     No acute events overnight. Denies pain. States she does not like the food and vomited last night. Has not ambulated. Remains afebrile. ON o2    Objective     Patient Vitals for the past 24 hrs:   Temp Pulse Resp BP SpO2   04/26/24 1648 -- -- 20 -- --   04/26/24 0759 98.2 °F (36.8 °C) 90 20 (!) 119/57 92 %   04/26/24 0700 -- -- -- -- 93 %   04/25/24 2217 -- -- 20 -- --     No intake/output data recorded.  No intake/output data recorded.        PE  GEN - Awake, alert, communicating appropriately.  NAD  Pulm - non-labored respirations on NC.   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 Case complicated by aspiration on induction with right lobe pneumonitis.     Plan     - Continue diet.   - Aspiration precautions.   - Needs to be OOB and ambulating. Has yet to ambulate to monitor oxygen requirements. Aware this is a barrier to discharge.   - Wean O2 as able   - Discharge once cleared by pulmonology.     ALFRED RIVERA MD    
          Pulmonary, Critical Care, and Sleep Medicine~Progress Note    Name: Thais De Guzman MRN: [id number]   : 1955 Hospital: [facility]   Date: 2024 11:31 AM Admission: 2024     Impression Plan   Abnormal chest x-ray.  Suspect more aspiration pneumonitis than aspiration pneumonia; afebrile, no leukocytosis, purulent sputum  Status post left trapezius intramuscular lipoma resection  Asthma, not bronchospastic  Suspected FAITH, not confirmed  Discussed with RN and family   Wean O2 with saturations above 89% to room air  Encouraged IS  Requested ambulation and assessing O2 requirements   Sputum culture, pending   Augmentin, continue  Add zyrtec   Add probiotics   DVT/PUD proph   Bronchodilators    Follow up chest x-ray in 4-6 wks   Will need outpatient sleep study      Daily Progression:      Chest film yesterday showed Right sided ASD worsening, suspect radiographic lag?   Still coughing up congestion   Did vomit overnight       Started coughing up more discolored sputum  Does not sound bronchospastic       Consult Note requested by hospitalist service     Patient presented for admission secondary to excision of a left intramuscular lipoma of the left trapezius muscle.  Underwent procedure 2024.  Unclear if the aspiration event happened during surgery or PACU but apparently it was a witnessed aspiration event.  She was n.p.o. prior to surgical intervention.  She has had several surgeries in the past without any incident.  Initially she was coughing up reddish colored sputum, but as of this morning it is normalizing.  There was only a small spot of dark red.  No discolored sputum mostly white and clear.  Postoperatively did not mount a white count, does not show any signs of febrile illness.  She has not diaphoretic or short of breath.  Denies any chronic cough.  No wheeze.  No chest pains reported.   is at bedside chest image taken following procedure showed a left 
6 min walk test:     Sitting on room air: 94%    Ambulating without room air for 6 mins: 90%    Pulmonologist (King Cline) aware. Patient stated she feels that her breathing has improved after being added on Mucinex.   
Attempted 6 min walk test with patient:     Ambulating on room air: Oxygen saturation 86%, patient coughing and feeling short of breath, took a break by the nurses station.     Ambulating with 2 liters of oxygen: 91%    Sitting without oxygen: 88% to 93%    Patient placed back on 1 liter.    Patient okay with staying another night to let Pulmonologist see and potentially send on home oxygen. Will notify Dr. Smith.     Dr. Luis trerazas with canceling discharge for today.   
General Surgery Progress Note    4 Days Post-Op   EXCISION OF INTRAMUSCULAR LIPOMA OF THE LEFT TRAPEZIUS MUSCLE (Left)   Date:2024       Room:[facility]  Patient Name:Thais De Guzman     YOB: 1955     Age:69 y.o.    Subjective     No acute surgical issues.  Pt is sitting in chair.  Tolerating diet.  Pain is under control.  Pt is currently on oxygen    Medications   Scheduled Meds:    albuterol  2.5 mg Nebulization BID RT    lactobacillus  1 capsule Oral Daily with breakfast    cetirizine  10 mg Oral Daily    amoxicillin-clavulanate  1 tablet Oral 2 times per day    budesonide  0.5 mg Nebulization BID RT    amLODIPine  10 mg Oral Daily    aspirin  81 mg Oral Daily    chlorthalidone  25 mg Oral Daily    ezetimibe  10 mg Oral Daily    pantoprazole  40 mg Oral QAM AC    sodium chloride flush  5-40 mL IntraVENous 2 times per day    enoxaparin  40 mg SubCUTAneous Daily    insulin lispro  0-4 Units SubCUTAneous Nightly     Continuous Infusions:    sodium chloride      dextrose       PRN Meds: sodium chloride flush, sodium chloride, potassium chloride **OR** potassium alternative oral replacement **OR** potassium chloride, magnesium sulfate, ondansetron **OR** ondansetron, polyethylene glycol, acetaminophen **OR** acetaminophen, oxyCODONE-acetaminophen **OR** oxyCODONE-acetaminophen, glucose, dextrose bolus **OR** dextrose bolus, glucagon (rDNA), dextrose        Physical Examination      Vitals:  /66   Pulse 81   Temp 99.1 °F (37.3 °C) (Oral)   Resp 15   Wt 96.5 kg (212 lb 12.8 oz)   SpO2 94%   BMI 38.92 kg/m²   Temp (24hrs), Av.9 °F (37.7 °C), Min:98.6 °F (37 °C), Max:102 °F (38.9 °C)      Physical Exam:    Gen:  No apparent distress  Neuro:  Alert and oriented x4  Pulm:  Unlabored mild diminished  Ext:  Left upper arm wound without erythema, drainage      I/O (24Hr):    Intake/Output Summary (Last 24 hours) at 2024 1335  Last data filed at 2024 1005  Gross per 24 hour   Intake 250 
General Surgery Progress Note    5 Days Post-Op   EXCISION OF INTRAMUSCULAR LIPOMA OF THE LEFT TRAPEZIUS MUSCLE (Left)   Date:2024       Room:[facility]  Patient Name:Thais De Guzman     YOB: 1955     Age:69 y.o.    Subjective     No acute surgical issues.  Pt is overall doing well but was unable to wean her off oxygen.  Pt reported hx COVID pneumonia in the past and was hospitalized for several months.    CXR showed improved but persistent airspace opacity     Medications   Scheduled Meds:    guaiFENesin  600 mg Oral BID    albuterol  2.5 mg Nebulization BID RT    lactobacillus  1 capsule Oral Daily with breakfast    cetirizine  10 mg Oral Daily    amoxicillin-clavulanate  1 tablet Oral 2 times per day    budesonide  0.5 mg Nebulization BID RT    amLODIPine  10 mg Oral Daily    aspirin  81 mg Oral Daily    chlorthalidone  25 mg Oral Daily    ezetimibe  10 mg Oral Daily    pantoprazole  40 mg Oral QAM AC    sodium chloride flush  5-40 mL IntraVENous 2 times per day    enoxaparin  40 mg SubCUTAneous Daily    insulin lispro  0-4 Units SubCUTAneous Nightly     Continuous Infusions:    sodium chloride      dextrose       PRN Meds: sodium chloride flush, sodium chloride, potassium chloride **OR** potassium alternative oral replacement **OR** potassium chloride, magnesium sulfate, ondansetron **OR** ondansetron, polyethylene glycol, acetaminophen **OR** acetaminophen, oxyCODONE-acetaminophen **OR** oxyCODONE-acetaminophen, glucose, dextrose bolus **OR** dextrose bolus, glucagon (rDNA), dextrose        Physical Examination      Vitals:  /63   Pulse 73   Temp 98.2 °F (36.8 °C)   Resp 18   Wt 96.5 kg (212 lb 12.8 oz)   SpO2 95%   BMI 38.92 kg/m²   Temp (24hrs), Av.4 °F (36.9 °C), Min:98.1 °F (36.7 °C), Max:99 °F (37.2 °C)      Physical Exam:    Gen:  No apparent distress  Neuro:  Alert and oriented x4  Pulm:  Unlabored mild diminished  Ext:  Left upper arm wound without erythema, 
Patient ambulated from bed to bathroom on room air (she had removed oxygen to use toilet) and then when she returned to bed, she coughed and developed chest tightness that was aching. Oxygen placed back on patient, instructed to perform deep breaths in through the nose. Dr. Dumont came in a few moments later, informed him of patient's complaints and vitals. Dr. Dumont spoke with the patient and nurse, states to continue with the I.S. and oxygen as the Pneumonitis is causing the symptoms the patient is complaining of. Patient feeling better after resting and deep breathing. Discussed with patient to use BSC so that she may keep the oxygen connected while toileting, patient agreed. Call bell in reach.  
Patient arrived to unit from PACU at shift change. Patient settled into bed, VSS, pct assisted patient to BSC. Call bell within reach. Report given to DEANDRE Rodriguez  
Still coughing and has chest pain. Degree of care clearly mandates change of status to inpatient. Await pulmonory recommendation about going home.  
[facility]   Daily Progress Note    Admit Date: 2024  Post-Operative Day: 6 Days Post-Op from Procedure(s):  EXCISION OF INTRAMUSCULAR LIPOMA OF THE LEFT TRAPEZIUS MUSCLE     Subjective:     Last 24 hrs: pt denies SOB, O2 sats ambulating in low 90s.         Objective:     Blood pressure (!) 127/57, pulse 73, temperature 97.7 °F (36.5 °C), temperature source Oral, resp. rate 18, weight 96.5 kg (212 lb 12.8 oz), SpO2 99 %.  Temp (24hrs), Av.1 °F (36.7 °C), Min:97.7 °F (36.5 °C), Max:98.2 °F (36.8 °C)      _____________________  Physical Exam:     Alert and Oriented, x3, in no acute distress.  Cardiovascular: RRR, no peripheral edema  Lungs:few wheezes  L upper arm w/ incision c/d/i      Assessment:   Principal Problem:    Intramuscular lipoma, left trapezius  Active Problems:    Angiolipoma    Lipoma of extremity    Pneumonitis due to aspiration during anesthesia  Resolved Problems:    * No resolved hospital problems. *          Plan:     May go home today  Pt will follow up w/ her PCP, Dr Sheldon  Follow up w/ Vicki Thomas NP on     Data Review:    No results for input(s): \"WBC\", \"HGB\", \"HCT\", \"PLT\" in the last 72 hours.  No results for input(s): \"NA\", \"K\", \"CL\", \"CO2\", \"GLU\", \"BUN\", \"CA\", \"MG\", \"PHOS\", \"ALB\", \"ALT\", \"INR\" in the last 72 hours.    Invalid input(s): \"CREA\", \"TBIL\", \"SGOT\"  No results for input(s): \"AML\" in the last 72 hours.    Invalid input(s): \"LPSE\"        ______________________  Medications:    Current Facility-Administered Medications   Medication Dose Route Frequency    guaiFENesin (MUCINEX) extended release tablet 600 mg  600 mg Oral BID    albuterol (PROVENTIL) (2.5 MG/3ML) 0.083% nebulizer solution 2.5 mg  2.5 mg Nebulization BID RT    lactobacillus (CULTURELLE) capsule 1 capsule  1 capsule Oral Daily with breakfast    cetirizine (ZYRTEC) tablet 10 mg  10 mg Oral Daily    amoxicillin-clavulanate (AUGMENTIN) 875-125 MG per tablet 1 tablet  1 tablet Oral 2 times per day    
Unfortunate aspiration with the typical right lung involvement.  So far looks likje a pneumonitis only.  Will continue pulmonary toilet.  Otherwise doing quite well.  
  Lungs:  CTA b/l, no rhonchi/crackles/wheeze, diminished BS at bases    ABD: Soft/NT, non rigid mildly distended    EXT: No cyanosis/clubbing/edema, normal peripheral pulses    Skin: No rashes or ulcers, no mottling    Neuro: A/O x 3        Medications:  Current Facility-Administered Medications   Medication Dose Route Frequency    amoxicillin-clavulanate (AUGMENTIN) 875-125 MG per tablet 1 tablet  1 tablet Oral 2 times per day    budesonide (PULMICORT) nebulizer suspension 500 mcg  0.5 mg Nebulization BID RT    amLODIPine (NORVASC) tablet 10 mg  10 mg Oral Daily    aspirin chewable tablet 81 mg  81 mg Oral Daily    chlorthalidone (HYGROTON) tablet 25 mg  25 mg Oral Daily    ezetimibe (ZETIA) tablet 10 mg  10 mg Oral Daily    pantoprazole (PROTONIX) tablet 40 mg  40 mg Oral QAM AC    sodium chloride flush 0.9 % injection 5-40 mL  5-40 mL IntraVENous 2 times per day    sodium chloride flush 0.9 % injection 5-40 mL  5-40 mL IntraVENous PRN    0.9 % sodium chloride infusion   IntraVENous PRN    potassium chloride (KLOR-CON) extended release tablet 40 mEq  40 mEq Oral PRN    Or    potassium bicarb-citric acid (EFFER-K) effervescent tablet 40 mEq  40 mEq Oral PRN    Or    potassium chloride 10 mEq/100 mL IVPB (Peripheral Line)  10 mEq IntraVENous PRN    magnesium sulfate 2000 mg in 50 mL IVPB premix  2,000 mg IntraVENous PRN    enoxaparin (LOVENOX) injection 40 mg  40 mg SubCUTAneous Daily    ondansetron (ZOFRAN-ODT) disintegrating tablet 4 mg  4 mg Oral Q8H PRN    Or    ondansetron (ZOFRAN) injection 4 mg  4 mg IntraVENous Q6H PRN    polyethylene glycol (GLYCOLAX) packet 17 g  17 g Oral Daily PRN    acetaminophen (TYLENOL) tablet 650 mg  650 mg Oral Q6H PRN    Or    acetaminophen (TYLENOL) suppository 650 mg  650 mg Rectal Q6H PRN    insulin lispro (HUMALOG) injection vial 0-4 Units  0-4 Units SubCUTAneous Nightly    oxyCODONE-acetaminophen (PERCOCET) 5-325 MG per tablet 1 tablet  1 tablet Oral Q4H PRN    Or    
IntraVENous Q6H PRN    polyethylene glycol (GLYCOLAX) packet 17 g  17 g Oral Daily PRN    acetaminophen (TYLENOL) tablet 650 mg  650 mg Oral Q6H PRN    Or    acetaminophen (TYLENOL) suppository 650 mg  650 mg Rectal Q6H PRN    insulin lispro (HUMALOG) injection vial 0-4 Units  0-4 Units SubCUTAneous Nightly    oxyCODONE-acetaminophen (PERCOCET) 5-325 MG per tablet 1 tablet  1 tablet Oral Q4H PRN    Or    oxyCODONE-acetaminophen (PERCOCET) 5-325 MG per tablet 2 tablet  2 tablet Oral Q4H PRN    glucose chewable tablet 16 g  4 tablet Oral PRN    dextrose bolus 10% 125 mL  125 mL IntraVENous PRN    Or    dextrose bolus 10% 250 mL  250 mL IntraVENous PRN    glucagon injection 1 mg  1 mg SubCUTAneous PRN    dextrose 10 % infusion   IntraVENous Continuous PRN       Labs:  ABG Invalid input(s): \"ABG\"     CBC No results for input(s): \"WBC\", \"HGB\", \"HCT\", \"PLT\", \"MCV\", \"MCH\" in the last 72 hours.     Metabolic  Panel No results for input(s): \"NA\", \"K\", \"CL\", \"CO2\", \"GLU\", \"BUN\", \"CA\", \"MG\", \"PHOS\", \"ALB\", \"ALT\", \"INR\" in the last 72 hours.    Invalid input(s): \"CREA\", \"TBIL\", \"SGOT\"     Pertinent Labs                King Cline PA-C  4/29/2024

## 2024-04-30 ENCOUNTER — TELEPHONE (OUTPATIENT)
Age: 69
End: 2024-04-30

## 2024-04-30 NOTE — TELEPHONE ENCOUNTER
[employer] assistant took my message inquiring about how she was doing and asking her to call if there were any questions or concerns.

## 2024-05-07 ENCOUNTER — OFFICE VISIT (OUTPATIENT)
Age: 69
End: 2024-05-07

## 2024-05-07 VITALS
HEART RATE: 74 BPM | OXYGEN SATURATION: 95 % | SYSTOLIC BLOOD PRESSURE: 138 MMHG | BODY MASS INDEX: 38.98 KG/M2 | HEIGHT: 62 IN | WEIGHT: 211.8 LBS | RESPIRATION RATE: 13 BRPM | TEMPERATURE: 98.4 F | DIASTOLIC BLOOD PRESSURE: 78 MMHG

## 2024-05-07 DIAGNOSIS — Z09 POSTOP CHECK: Primary | ICD-10-CM

## 2024-05-07 PROCEDURE — 99024 POSTOP FOLLOW-UP VISIT: CPT | Performed by: NURSE PRACTITIONER

## 2024-05-07 ASSESSMENT — PATIENT HEALTH QUESTIONNAIRE - PHQ9
2. FEELING DOWN, DEPRESSED OR HOPELESS: SEVERAL DAYS
SUM OF ALL RESPONSES TO PHQ QUESTIONS 1-9: 1
SUM OF ALL RESPONSES TO PHQ9 QUESTIONS 1 & 2: 1
1. LITTLE INTEREST OR PLEASURE IN DOING THINGS: NOT AT ALL
SUM OF ALL RESPONSES TO PHQ QUESTIONS 1-9: 1

## 2024-05-07 NOTE — PROGRESS NOTES
Identified patient with two patient identifiers (name and ). Reviewed chart in preparation for visit and have obtained necessary documentation.    Thais De Guzman is a 69 y.o. female  Chief Complaint   Patient presents with    Post-Op Check     2 weeks s/p EXCISION OF INTRAMUSCULAR LIPOMA OF THE LEFT TRAPEZIUS MUSCLE     /78 (Site: Left Lower Arm, Position: Sitting, Cuff Size: Small Adult)   Pulse 74   Temp 98.4 °F (36.9 °C) (Oral)   Resp 13   Ht 1.575 m (5' 2\")   Wt 96.1 kg (211 lb 12.8 oz)   SpO2 95%   BMI 38.74 kg/m²     1. Have you been to the ER, urgent care clinic since your last visit?  Hospitalized since your last visit?no    2. Have you seen or consulted any other health care providers outside of the [facility] since your last visit?  Include any pap smears or colon screening. no

## 2024-05-07 NOTE — PROGRESS NOTES
Subjective:      Thais De Guzman is a 69 y.o. female who presents today for wound check. She has a 2 weeks status post left trapezius lipoma.   Was admitted for 6 days for aspiration pneumonia. She is feeling better  Ms. De Guzman has a reminder for a \"due or due soon\" health maintenance. I have asked that she contact her primary care provider for follow-up on this health maintenance.          Objective:     /78 (Site: Left Lower Arm, Position: Sitting, Cuff Size: Small Adult)   Pulse 74   Temp 98.4 °F (36.9 °C) (Oral)   Resp 13   Ht 1.575 m (5' 2\")   Wt 96.1 kg (211 lb 12.8 oz)   SpO2 95%   BMI 38.74 kg/m²     Wound:   wound margins intact and healing well.  No signs of infection.     Assessment:     Wound check.       Plan:     Follow up as needed  May increase activity as tolerated.   Dr. Dumont reviewed path with patient.   May get incision wet.   Continue mucinex for 1 month  SANDY Zheng - NP

## 2024-09-29 RX ORDER — INSULIN DEGLUDEC 100 U/ML
INJECTION, SOLUTION SUBCUTANEOUS
Refills: 5 | OUTPATIENT
Start: 2024-09-29

## 2024-11-25 RX ORDER — TIRZEPATIDE 5 MG/.5ML
INJECTION, SOLUTION SUBCUTANEOUS
OUTPATIENT
Start: 2024-11-25

## 2024-12-16 RX ORDER — TIRZEPATIDE 5 MG/.5ML
INJECTION, SOLUTION SUBCUTANEOUS
OUTPATIENT
Start: 2024-12-16

## 2025-03-04 RX ORDER — TIRZEPATIDE 5 MG/.5ML
INJECTION, SOLUTION SUBCUTANEOUS
OUTPATIENT
Start: 2025-03-04

## (undated) DEVICE — APPLICATOR MEDICATED 26 CC SOLUTION HI LT ORNG CHLORAPREP

## (undated) DEVICE — X-RAY DETECTABLE SPONGES,16 PLY: Brand: VISTEC

## (undated) DEVICE — SUTURE VICRYL + SZ 3-0 L27IN ABSRB UD L26MM SH 1/2 CIR VCP416H

## (undated) DEVICE — GLOVE ORANGE PI 7 1/2   MSG9075

## (undated) DEVICE — TOWEL SURG W17XL27IN STD BLU COT NONFENESTRATED PREWASHED

## (undated) DEVICE — ELECTRODE PT RET AD L9FT HI MOIST COND ADH HYDRGEL CORDED

## (undated) DEVICE — PENCIL SMK EVAC 10 FT BLADE ELECTRD ROCKER FOR TELSCP

## (undated) DEVICE — AGENT HEMSTAT W4XL4IN OXIDIZED REGENERATED CELOS STRUCTURED

## (undated) DEVICE — DRAPE,UTILTY,TAPE,15X26, 4EA/PK: Brand: MEDLINE

## (undated) DEVICE — LIQUIBAND RAPID ADHESIVE 36/CS 0.8ML: Brand: MEDLINE

## (undated) DEVICE — SUTURE MONOCRYL SZ 4-0 L27IN ABSRB UD L19MM PS-2 1/2 CIR PRIM Y426H

## (undated) DEVICE — PACK,LAPAROTOMY,2 REINFORCED GOWNS: Brand: MEDLINE

## (undated) DEVICE — BASIN ST MAJOR-NO CAUTERY: Brand: MEDLINE INDUSTRIES, INC.

## (undated) DEVICE — SOLUTION IRRIG 1000ML 0.9% SOD CHL USP POUR PLAS BTL